# Patient Record
Sex: FEMALE | Race: BLACK OR AFRICAN AMERICAN | NOT HISPANIC OR LATINO | Employment: FULL TIME | URBAN - METROPOLITAN AREA
[De-identification: names, ages, dates, MRNs, and addresses within clinical notes are randomized per-mention and may not be internally consistent; named-entity substitution may affect disease eponyms.]

---

## 2017-02-16 ENCOUNTER — TELEPHONE (OUTPATIENT)
Dept: NEUROLOGY | Facility: CLINIC | Age: 22
End: 2017-02-16

## 2017-02-16 NOTE — TELEPHONE ENCOUNTER
Returned patient's call. Patient offered an appt with Dr Clark. Patient stated that she wants to know if Dr Clark takes her insurance. Patient asked that I find out and call her back tomorrow.

## 2017-02-16 NOTE — TELEPHONE ENCOUNTER
----- Message from Keke Hu sent at 2/16/2017  3:16 PM CST -----  Contact: Pt [phone]  States she is calling rg having medicaid ([facility]) and is wanting to be seen by . Is coming in for headaches and anxiety meds and can be reached at [phone]     There is an an appt avail on 03/23/17 in [place] that was avail

## 2017-02-24 ENCOUNTER — OFFICE VISIT (OUTPATIENT)
Dept: NEUROLOGY | Facility: CLINIC | Age: 22
End: 2017-02-24
Payer: MEDICAID

## 2017-02-24 VITALS
HEART RATE: 88 BPM | DIASTOLIC BLOOD PRESSURE: 94 MMHG | HEIGHT: 62 IN | WEIGHT: 125.25 LBS | BODY MASS INDEX: 23.05 KG/M2 | SYSTOLIC BLOOD PRESSURE: 146 MMHG

## 2017-02-24 DIAGNOSIS — G47.419 PRIMARY NARCOLEPSY WITHOUT CATAPLEXY: Primary | ICD-10-CM

## 2017-02-24 DIAGNOSIS — R29.2 HYPER-REFLEXIA: ICD-10-CM

## 2017-02-24 DIAGNOSIS — R53.82 CHRONIC FATIGUE: ICD-10-CM

## 2017-02-24 PROCEDURE — 99205 OFFICE O/P NEW HI 60 MIN: CPT | Mod: S$PBB,,, | Performed by: PSYCHIATRY & NEUROLOGY

## 2017-02-24 PROCEDURE — 99213 OFFICE O/P EST LOW 20 MIN: CPT | Mod: PBBFAC | Performed by: PSYCHIATRY & NEUROLOGY

## 2017-02-24 PROCEDURE — 99999 PR PBB SHADOW E&M-EST. PATIENT-LVL III: CPT | Mod: PBBFAC,,, | Performed by: PSYCHIATRY & NEUROLOGY

## 2017-02-24 NOTE — MR AVS SNAPSHOT
[facility]  [address] [facility] [address]  Phone: [phone]  Fax: [phone]                  Christopher Webber   2017 10:00 AM   Office Visit    Description:  Female : 1995   Provider:  Narda Red MD   Department:  [facility]           Reason for Visit     Headache           Diagnoses this Visit        Comments    Primary narcolepsy without cataplexy    -  Primary     Chronic fatigue         Hyper-reflexia                To Do List           Future Appointments        Provider Department Dept Phone    2017 11:30 AM LABORATORY, [address] [facility] [phone]    3/3/2017 9:00 AM [facility] MRI1 [facility] -  [phone]    3/6/2017 11:15 AM Brooke Amado CNM [facility] OB/ -[phone]    2017 9:40 AM Narda Red MD [facility] [phone]      Goals (5 Years of Data)     None      Follow-Up and Disposition     Return in about 1 month (around 3/24/2017).      [facility] -  Assistance  Registered nurses in the [facility] provide clinical advisement, health education, appointment booking, and other advisory services.  Call for this free service at [phone].             Medications           Message regarding Medications     Verify the changes and/or additions to your medication regime listed below are the same as discussed with your clinician today.  If any of these changes or additions are incorrect, please notify your healthcare provider.             Verify that the below list of medications is an accurate representation of the medications you are currently taking.  If none reported, the list may be blank. If incorrect, please contact your healthcare provider. Carry this list with you in case of emergency.           Current Medications     buPROPion (WELLBUTRIN) 100 MG tablet Take 100 mg by mouth.    escitalopram oxalate (LEXAPRO) 20 MG  tablet Take 20 mg by mouth.    pantoprazole (PROTONIX) 40 MG tablet TK 1 T PO QD           Clinical Reference Information           Your Vitals Were     BP                   146/94           Blood Pressure          Most Recent Value    BP  (!)  146/94      Allergies as of 2/24/2017     No Known Allergies      Immunizations Administered on Date of Encounter - 2/24/2017     None      Orders Placed During Today's Visit     Future Labs/Procedures Expected by Expires    CBC auto differential  2/24/2017 4/25/2018    Comprehensive metabolic panel  2/24/2017 4/25/2018    MRI Brain W WO Contrast  2/24/2017 2/24/2018    TSH  2/24/2017 4/25/2018    Polysomnography with MSLT  As directed 2/24/2018      Language Assistance Services     ATTENTION: Language assistance services are available, free of charge. Please call [phone].      ATENCIÓN: Si robbyla jenaelyubov, tiene a grayson disposición servicios gratuitos de asistencia lingüística. Llame al [phone].     CHÚ Ý: N?u b?n nói Ti?ng Vi?t, có các d?ch v? h? tr? ngôn ng? mi?n phí dành cho b?n. G?i s? [phone].         [facility] complies with applicable Federal civil rights laws and does not discriminate on the basis of race, color, national origin, age, disability, or sex.

## 2017-02-24 NOTE — PROGRESS NOTES
Consult Requested By: No att. providers found  Reason for Consult:   1. Narcollepsy  2. Sleepiness   3. Tired and fatigued.    SUBJECTIVE:       HPI:   This is a 21-year-old right-handed pleasant lady, presented today in the clinic   with the complaint of tiredness, fatigue for three to four years.  She said that   she was diagnosed with narcolepsy at age 17 and she was given Ritalin and Provigil  in   the past, but because of the side effects she did not take it. However, now she   is feeling sleepiness all the time, plus she is drowsy, fatigued and tired all   day.  It is affecting her work.  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  She also states that she has problem with anxiety and for that she   take Lexapro and Wellbutrin.  She is not sleeping well, feels tired and sometime   at night she has some jerking in sleep.  She said that she had sleep paralysis   when she was diagnosed with narcolepsy, but that has gotten better.  She does   not take any medication for narcolepsy now.      Past Medical History:   Diagnosis Date    Narcolepsy      Past Surgical History:   Procedure Laterality Date    tonsilectomy       Family History   Problem Relation Age of Onset    Breast cancer Neg Hx     Cancer Neg Hx     Colon cancer Neg Hx     Diabetes Neg Hx     Eclampsia Neg Hx     Hypertension Neg Hx     Miscarriages / Stillbirths Neg Hx      labor Neg Hx     Ovarian cancer Neg Hx     Stroke Neg Hx      Social History   Substance Use Topics    Smoking status: Never Smoker    Smokeless tobacco: Never Used    Alcohol use No      Comment: social     Review of patient's allergies indicates:  No Known Allergies    Review of Systems   Constitutional: Negative for fever and weight loss.   HENT: Negative for hearing loss.    Eyes: Negative for blurred vision, double vision, photophobia and pain.   Respiratory: Negative for cough.    Cardiovascular: Negative for chest pain.   Gastrointestinal:  Negative for abdominal pain, nausea and vomiting.   Genitourinary: Negative for dysuria, frequency and urgency.   Musculoskeletal: Negative.  Negative for back pain, falls, joint pain, myalgias and neck pain.   Skin: Negative for itching and rash.   Neurological: Negative for tingling and headaches.        Hypersomnia   Tired and fatigued    Psychiatric/Behavioral: Negative for depression and memory loss. The patient is nervous/anxious.          OBJECTIVE:     Vital Signs (Most Recent)  Pulse: 88 (02/24/17 1008)  BP: (!) 146/94 (02/24/17 1008)    Physical Exam   Constitutional: She is oriented to person, place, and time. She appears well-developed and well-nourished.   HENT:   Head: Normocephalic and atraumatic.   Eyes: Conjunctivae and EOM are normal. Pupils are equal, round, and reactive to light.   Neck: Normal range of motion. Neck supple. No JVD present. No tracheal deviation present. No thyromegaly present.   Cardiovascular: Normal rate, regular rhythm and normal heart sounds.    Pulmonary/Chest: Effort normal and breath sounds normal.   Abdominal: She exhibits no distension. There is no tenderness.   Musculoskeletal: Normal range of motion. She exhibits no edema or tenderness.   Neurological: She is alert and oriented to person, place, and time. She has normal strength. She displays normal reflexes. No cranial nerve deficit or sensory deficit. She exhibits normal muscle tone. She displays a negative Romberg sign. Coordination and gait normal.   Reflex Scores:       Tricep reflexes are 3+ on the right side and 3+ on the left side.       Bicep reflexes are 3+ on the right side and 3+ on the left side.       Brachioradialis reflexes are 3+ on the right side and 3+ on the left side.       Patellar reflexes are 3+ on the right side and 3+ on the left side.       Achilles reflexes are 4+ on the right side and 4+ on the left side.  She has nonsustained clonus in ankles .   Skin: Skin is warm and dry. No rash noted.    Psychiatric: She has a normal mood and affect. Her behavior is normal. Judgment and thought content normal.         Strength  Deltoids Triceps Biceps Wrist Extension Wrist Flexion Hand    Upper: R 5/5 5/5 5/5 5/5 5/5 5/5    L 5/5 5/5 5/5 5/5 5/5 5/5     Iliopsoas Quadriceps Knee  Flexion Tibialis  anterior Gastro- cnemius EHL   Lower: R 5/5 5/5 5/5 5/5 5/5 5/5    L 5/5 5/5 5/5 5/5 5/5 5/5         ASSESSMENT/PLAN:     Primary Diagnoses:  1.  History of Narcolepsy  2.  Chronic fatigue and tiredness   3. Rule out MS      Plan:  1. MRI of the brain   2. Sleep study.  3. Blood works.  Time spent: 60 minutes in face to face discussion concerning diagnosis, prognosis, review of lab and test results, benefits of treatment as well as management of disease, counseling of patient and coordination of care between various health care providers . Greater than half the time spent was used for coordination of care and counseling of patient.

## 2017-02-27 ENCOUNTER — TELEPHONE (OUTPATIENT)
Dept: PULMONOLOGY | Facility: HOSPITAL | Age: 22
End: 2017-02-27

## 2017-03-02 DIAGNOSIS — R29.2 HYPERREFLEXIA: ICD-10-CM

## 2017-03-02 DIAGNOSIS — R53.82 CHRONIC FATIGUE: Primary | ICD-10-CM

## 2017-03-06 ENCOUNTER — OFFICE VISIT (OUTPATIENT)
Dept: OBSTETRICS AND GYNECOLOGY | Facility: CLINIC | Age: 22
End: 2017-03-06
Payer: MEDICAID

## 2017-03-06 ENCOUNTER — LAB VISIT (OUTPATIENT)
Dept: LAB | Facility: HOSPITAL | Age: 22
End: 2017-03-06
Attending: PSYCHIATRY & NEUROLOGY
Payer: MEDICAID

## 2017-03-06 VITALS
DIASTOLIC BLOOD PRESSURE: 78 MMHG | SYSTOLIC BLOOD PRESSURE: 122 MMHG | HEIGHT: 62 IN | WEIGHT: 127 LBS | BODY MASS INDEX: 23.37 KG/M2

## 2017-03-06 DIAGNOSIS — G47.419 PRIMARY NARCOLEPSY WITHOUT CATAPLEXY: ICD-10-CM

## 2017-03-06 DIAGNOSIS — R29.2 HYPERREFLEXIA: ICD-10-CM

## 2017-03-06 DIAGNOSIS — R29.2 HYPER-REFLEXIA: ICD-10-CM

## 2017-03-06 DIAGNOSIS — Z12.4 ENCOUNTER FOR PAPANICOLAOU SMEAR FOR CERVICAL CANCER SCREENING: ICD-10-CM

## 2017-03-06 DIAGNOSIS — Z01.419 VISIT FOR GYNECOLOGIC EXAMINATION: Primary | ICD-10-CM

## 2017-03-06 DIAGNOSIS — R53.82 CHRONIC FATIGUE: ICD-10-CM

## 2017-03-06 DIAGNOSIS — Z01.419 VISIT FOR GYNECOLOGIC EXAMINATION: ICD-10-CM

## 2017-03-06 LAB
ALBUMIN SERPL BCP-MCNC: 3.9 G/DL
ALP SERPL-CCNC: 88 U/L
ALT SERPL W/O P-5'-P-CCNC: 14 U/L
ANION GAP SERPL CALC-SCNC: 9 MMOL/L
AST SERPL-CCNC: 20 U/L
BASOPHILS # BLD AUTO: 0.02 K/UL
BASOPHILS NFR BLD: 0.3 %
BILIRUB SERPL-MCNC: 0.4 MG/DL
BUN SERPL-MCNC: 6 MG/DL
BUN SERPL-MCNC: 6 MG/DL
CALCIUM SERPL-MCNC: 9.6 MG/DL
CHLORIDE SERPL-SCNC: 105 MMOL/L
CO2 SERPL-SCNC: 25 MMOL/L
CREAT SERPL-MCNC: 0.9 MG/DL
CREAT SERPL-MCNC: 0.9 MG/DL
DIFFERENTIAL METHOD: ABNORMAL
EOSINOPHIL # BLD AUTO: 0.1 K/UL
EOSINOPHIL NFR BLD: 0.9 %
ERYTHROCYTE [DISTWIDTH] IN BLOOD BY AUTOMATED COUNT: 14.8 %
EST. GFR  (AFRICAN AMERICAN): >60 ML/MIN/1.73 M^2
EST. GFR  (AFRICAN AMERICAN): >60 ML/MIN/1.73 M^2
EST. GFR  (NON AFRICAN AMERICAN): >60 ML/MIN/1.73 M^2
EST. GFR  (NON AFRICAN AMERICAN): >60 ML/MIN/1.73 M^2
GLUCOSE SERPL-MCNC: 66 MG/DL
HCT VFR BLD AUTO: 42.3 %
HGB BLD-MCNC: 14.6 G/DL
LYMPHOCYTES # BLD AUTO: 2.2 K/UL
LYMPHOCYTES NFR BLD: 34.3 %
MCH RBC QN AUTO: 28 PG
MCHC RBC AUTO-ENTMCNC: 34.5 %
MCV RBC AUTO: 81 FL
MONOCYTES # BLD AUTO: 0.4 K/UL
MONOCYTES NFR BLD: 6.4 %
NEUTROPHILS # BLD AUTO: 3.8 K/UL
NEUTROPHILS NFR BLD: 57.9 %
PLATELET # BLD AUTO: 389 K/UL
PMV BLD AUTO: 9.5 FL
POTASSIUM SERPL-SCNC: 3.4 MMOL/L
PROT SERPL-MCNC: 7.8 G/DL
RBC # BLD AUTO: 5.22 M/UL
SODIUM SERPL-SCNC: 139 MMOL/L
TSH SERPL DL<=0.005 MIU/L-ACNC: 0.48 UIU/ML
WBC # BLD AUTO: 6.54 K/UL

## 2017-03-06 PROCEDURE — 86592 SYPHILIS TEST NON-TREP QUAL: CPT

## 2017-03-06 PROCEDURE — 85025 COMPLETE CBC W/AUTO DIFF WBC: CPT

## 2017-03-06 PROCEDURE — 99395 PREV VISIT EST AGE 18-39: CPT | Mod: S$PBB,,, | Performed by: ADVANCED PRACTICE MIDWIFE

## 2017-03-06 PROCEDURE — 36415 COLL VENOUS BLD VENIPUNCTURE: CPT

## 2017-03-06 PROCEDURE — 80053 COMPREHEN METABOLIC PANEL: CPT

## 2017-03-06 PROCEDURE — 84443 ASSAY THYROID STIM HORMONE: CPT

## 2017-03-06 PROCEDURE — 99999 PR PBB SHADOW E&M-EST. PATIENT-LVL II: CPT | Mod: PBBFAC,,, | Performed by: ADVANCED PRACTICE MIDWIFE

## 2017-03-06 PROCEDURE — 86703 HIV-1/HIV-2 1 RESULT ANTBDY: CPT

## 2017-03-06 PROCEDURE — 80074 ACUTE HEPATITIS PANEL: CPT

## 2017-03-06 NOTE — PROGRESS NOTES
Subjective:       Christopher Webber is a 21 y.o. female here for a routine exam.  Current complaints:   Chief Complaint   Patient presents with    Annual Exam    Gynecologic Exam   .      Gynecologic History  Patient's last menstrual period was 2017 (approximate).  Contraception: none declines need  Last Pap: NA. Results were: NA    Obstetric History  OB History    Para Term  AB SAB TAB Ectopic Multiple Living   0 0 0 0 0 0 0 0 0 0               The following portions of the patient's history were reviewed and updated as appropriate: She  has a past medical history of Narcolepsy.  She  has a past surgical history that includes tonsilectomy.  Her family history is negative for Breast cancer, Cancer, Colon cancer, Diabetes, Eclampsia, Hypertension, Miscarriages / Stillbirths,  labor, Ovarian cancer, and Stroke.  She  reports that she has never smoked. She has never used smokeless tobacco. She reports that she does not drink alcohol or use illicit drugs..    Review of Systems  A comprehensive review of systems was negative.      Objective:       APPEARANCE: Well nourished, well developed, in no acute distress.  AFFECT: WNL, alert and oriented x 3  SKIN: No acne or hirsutism  NECK: Neck symmetric without masses or thyromegaly  NODES: No inguinal, cervical, axillary, or femoral lymph node enlargement  ABDOMEN: Soft.  No tenderness or masses.  No hepatosplenomegaly.  No hernias.  BREASTS: Symmetrical, no skin changes or visible lesions.  No palpable masses, nipple discharge bilaterally.  PELVIC: Normal external genitalia without lesions.  Normal hair distribution.  Adequate perineal body, normal urethral meatus.  Vagina moist and well rugated without lesions or discharge.  Cervix pink, without lesions, discharge or tenderness.  No significant cystocele or rectocele.  Bimanual exam shows uterus to be normal size, regular, mobile and nontender.  Adnexa without masses or tenderness.    EXTREMITIES:  No edema.    Assessment:      Healthy female exam.   Plan:   Contraception: declines need for any today.  Follow up in: 1 year.   Visit for gynecologic examination  -     HIV-1 and HIV-2 antibodies; Future; Expected date: 3/6/17  -     RPR; Future; Expected date: 3/6/17  -     C. trachomatis/N. gonorrhoeae by AMP DNA Vagina  -     Hepatitis panel, acute; Future; Expected date: 3/6/17    Encounter for Papanicolaou smear for cervical cancer screening  -     Liquid-based pap smear, screening     discussed pap frequency, labs today. al

## 2017-03-07 ENCOUNTER — PATIENT MESSAGE (OUTPATIENT)
Dept: NEUROLOGY | Facility: CLINIC | Age: 22
End: 2017-03-07

## 2017-03-07 LAB
HAV IGM SERPL QL IA: NEGATIVE
HBV CORE IGM SERPL QL IA: NEGATIVE
HBV SURFACE AG SERPL QL IA: NEGATIVE
HCV AB SERPL QL IA: NEGATIVE
HIV 1+2 AB+HIV1 P24 AG SERPL QL IA: NEGATIVE
RPR SER QL: NORMAL

## 2017-03-08 LAB
C TRACH DNA SPEC QL NAA+PROBE: NEGATIVE
N GONORRHOEA DNA SPEC QL NAA+PROBE: NEGATIVE

## 2017-03-15 ENCOUNTER — PATIENT MESSAGE (OUTPATIENT)
Dept: NEUROLOGY | Facility: CLINIC | Age: 22
End: 2017-03-15

## 2017-03-17 ENCOUNTER — PATIENT MESSAGE (OUTPATIENT)
Dept: NEUROLOGY | Facility: CLINIC | Age: 22
End: 2017-03-17

## 2017-03-18 ENCOUNTER — HOSPITAL ENCOUNTER (OUTPATIENT)
Dept: SLEEP MEDICINE | Facility: HOSPITAL | Age: 22
Discharge: HOME | End: 2017-03-18
Attending: PSYCHIATRY & NEUROLOGY
Payer: MEDICAID

## 2017-03-18 DIAGNOSIS — G47.419 PRIMARY NARCOLEPSY WITHOUT CATAPLEXY: ICD-10-CM

## 2017-03-18 DIAGNOSIS — R53.82 CHRONIC FATIGUE: ICD-10-CM

## 2017-03-18 DIAGNOSIS — R29.2 HYPER-REFLEXIA: ICD-10-CM

## 2017-03-18 DIAGNOSIS — G47.12 IDIOPATHIC HYPERSOMNIA WITHOUT LONG SLEEP TIME: ICD-10-CM

## 2017-03-18 PROCEDURE — 95810 POLYSOM 6/> YRS 4/> PARAM: CPT | Mod: 26,,, | Performed by: INTERNAL MEDICINE

## 2017-03-18 PROCEDURE — 95805 MULTIPLE SLEEP LATENCY TEST: CPT | Mod: ,,, | Performed by: INTERNAL MEDICINE

## 2017-03-18 PROCEDURE — 95805 MULTIPLE SLEEP LATENCY TEST: CPT

## 2017-03-18 PROCEDURE — 95810 POLYSOM 6/> YRS 4/> PARAM: CPT

## 2017-03-21 NOTE — PROCEDURES
SUMMARY STATEMENTS:  1. Findings related to sleep diagnoses:   Normal sleep architecture.  Sleep efficiency was high.  Sleep latency was delayed.  Total sleep time was 6.7 hours.   No periodic leg movements.  Sleep arousal index was 15.5 events per hour.   Normal oxygen saturation.  Normal AHI.  2. EEG abnormalities:   REM latency was normal 101 minutes.  Wake after sleep onset was low.  Sleep continuity was high.  3. ECG abnormalities:   Normal EKG.  Average heart rate 90 bpm with a maximal heart rate 1 29 bpm    INTERPRETATION:     1. Normal apnea hypopnea index.  Normal sleep architecture.  Normal sleep efficiency.  2. Sleep latency was delayed.    3. Total sleep time was 6.7 hours.  4. Normal apnea Hypopnea  index.  No periodic limb movements.    SDI questionnaire was reviewed.    Study was ordered based on restless sleep, waking up groggy for 5 years.    Bedtime is 10 PM wakeup time is 9 AM.  Sleep latency delayed. Trouble falling asleep, prefers to go to bed late and wake up late. Legs feel odd and restless and must move them to feel better.  Denied cataplexy, visual hallucination, or sleep paralysis. Worry too much, hard to fall asleep, running thoughts.  Patient often moves violently during sleep.  Reported alcohol use within 2 hours of bedtime 5 nights a month.  Had been using alcohol to help fall asleep.  This is for 10 days in the month.  Take frequent naps.      Conclusions:    1. 4 nap protocols was utilized.    2. Patient had a normal diagnostic polysomnography.  Normal AHI.  3. REM sleep was recorded on the first and second nap.  Sleep latency on the first and second nap was less than 2 minutes.  4. Overall sleep late tendency was 10.26 minutes.  This is within the normal range.  However the presence of REM onset sleep is suggestive/supportive of narcolepsy; however Mean sleep latency needs to be < 8.0 minutes.  5. Other entities like chronic sleep deprivation also need to be  "considered    RECOMMENDATIONS:     1. Study doesnt fulfil all criteria for Narcolepsy type 1 or type 2 diagnosis.  2. Study demonstrates normal sleep latency however pathological sleepiness in first 2 naps : this can be seen in underlying mental illness or chronic sleep deprivation   3. Clinical correlation for circadian rhythm disorder (delayed sleep phase) or restless legs syndrome.  4. Consider  HLA typing for  DQB1*0602 or DR2 positivity  5. Improve sleep hygiene and alcohol abstinence.  6. Sleep diary  7. If clinical suspicion for Narcolepsy is still high test may be repeated. Patient would have to be tapered off LEXAPRO ( suppresses REM)  8. Follow up in sleep clinic with Sleep physician      See imported Sleep Study result in "Chart Review" under the   "Media tab".      (This Sleep Study was interpreted by a Board Certified Sleep   Specialist who conducted an epoch-by-epoch review of the entire   raw data recording.)     (The indication for this sleep study was reviewed and deemed   appropriate by [employer] Practice Parameters or other reasons by a   Board Certified Sleep Specialist.)    Kenny Lopez MD      "

## 2017-03-23 ENCOUNTER — TELEPHONE (OUTPATIENT)
Dept: RADIOLOGY | Facility: HOSPITAL | Age: 22
End: 2017-03-23

## 2017-03-24 ENCOUNTER — HOSPITAL ENCOUNTER (OUTPATIENT)
Dept: RADIOLOGY | Facility: HOSPITAL | Age: 22
Discharge: HOME | End: 2017-03-24
Attending: PSYCHIATRY & NEUROLOGY
Payer: MEDICAID

## 2017-03-24 DIAGNOSIS — G47.419 PRIMARY NARCOLEPSY WITHOUT CATAPLEXY: ICD-10-CM

## 2017-03-24 DIAGNOSIS — R53.82 CHRONIC FATIGUE: ICD-10-CM

## 2017-03-24 DIAGNOSIS — R29.2 HYPER-REFLEXIA: ICD-10-CM

## 2017-03-24 PROCEDURE — A9585 GADOBUTROL INJECTION: HCPCS | Mod: PO | Performed by: PSYCHIATRY & NEUROLOGY

## 2017-03-24 PROCEDURE — 70553 MRI BRAIN STEM W/O & W/DYE: CPT | Mod: TC,PO

## 2017-03-24 PROCEDURE — 25500020 PHARM REV CODE 255: Mod: PO | Performed by: PSYCHIATRY & NEUROLOGY

## 2017-03-24 PROCEDURE — 70553 MRI BRAIN STEM W/O & W/DYE: CPT | Mod: 26,,, | Performed by: RADIOLOGY

## 2017-03-24 RX ORDER — GADOBUTROL 604.72 MG/ML
5.5 INJECTION INTRAVENOUS
Status: COMPLETED | OUTPATIENT
Start: 2017-03-24 | End: 2017-03-24

## 2017-03-24 RX ADMIN — GADOBUTROL 5.5 ML: 604.72 INJECTION INTRAVENOUS at 03:03

## 2017-03-25 ENCOUNTER — PATIENT MESSAGE (OUTPATIENT)
Dept: OBSTETRICS AND GYNECOLOGY | Facility: CLINIC | Age: 22
End: 2017-03-25

## 2017-03-30 ENCOUNTER — TELEPHONE (OUTPATIENT)
Dept: NEUROLOGY | Facility: CLINIC | Age: 22
End: 2017-03-30

## 2017-03-30 NOTE — TELEPHONE ENCOUNTER
----- Message from Ingrid Fajardo sent at 3/30/2017  9:27 AM CDT -----  Contact: pt  Pt returning nurse call, please call pt @ [phone].

## 2017-03-31 ENCOUNTER — TELEPHONE (OUTPATIENT)
Dept: PULMONOLOGY | Facility: CLINIC | Age: 22
End: 2017-03-31

## 2017-03-31 DIAGNOSIS — G47.30 SLEEP DISORDER BREATHING: Primary | ICD-10-CM

## 2017-07-03 ENCOUNTER — OFFICE VISIT (OUTPATIENT)
Dept: INTERNAL MEDICINE | Facility: CLINIC | Age: 22
End: 2017-07-03
Payer: COMMERCIAL

## 2017-07-03 ENCOUNTER — PATIENT MESSAGE (OUTPATIENT)
Dept: INTERNAL MEDICINE | Facility: CLINIC | Age: 22
End: 2017-07-03

## 2017-07-03 VITALS
DIASTOLIC BLOOD PRESSURE: 90 MMHG | TEMPERATURE: 97 F | OXYGEN SATURATION: 99 % | HEIGHT: 62 IN | HEART RATE: 88 BPM | WEIGHT: 121.25 LBS | SYSTOLIC BLOOD PRESSURE: 122 MMHG | BODY MASS INDEX: 22.31 KG/M2

## 2017-07-03 DIAGNOSIS — B37.9 YEAST INFECTION: ICD-10-CM

## 2017-07-03 DIAGNOSIS — R30.0 DYSURIA: Primary | ICD-10-CM

## 2017-07-03 DIAGNOSIS — N76.0 BACTERIAL VAGINOSIS: ICD-10-CM

## 2017-07-03 DIAGNOSIS — N30.00 ACUTE CYSTITIS WITHOUT HEMATURIA: ICD-10-CM

## 2017-07-03 DIAGNOSIS — B96.89 BACTERIAL VAGINOSIS: ICD-10-CM

## 2017-07-03 LAB
BACTERIA #/AREA URNS HPF: ABNORMAL /HPF
BILIRUB UR QL STRIP: NEGATIVE
CLARITY UR: ABNORMAL
COLOR UR: YELLOW
GLUCOSE UR QL STRIP: NEGATIVE
HGB UR QL STRIP: ABNORMAL
KETONES UR QL STRIP: NEGATIVE
LEUKOCYTE ESTERASE UR QL STRIP: ABNORMAL
MICROSCOPIC COMMENT: ABNORMAL
NITRITE UR QL STRIP: NEGATIVE
PH UR STRIP: 6 [PH] (ref 5–8)
PROT UR QL STRIP: NEGATIVE
RBC #/AREA URNS HPF: 10 /HPF (ref 0–4)
SP GR UR STRIP: 1.01 (ref 1–1.03)
SQUAMOUS #/AREA URNS HPF: 4 /HPF
URN SPEC COLLECT METH UR: ABNORMAL
WBC #/AREA URNS HPF: 80 /HPF (ref 0–5)

## 2017-07-03 PROCEDURE — 87088 URINE BACTERIA CULTURE: CPT

## 2017-07-03 PROCEDURE — 99999 PR PBB SHADOW E&M-EST. PATIENT-LVL III: CPT | Mod: PBBFAC,,, | Performed by: FAMILY MEDICINE

## 2017-07-03 PROCEDURE — 87077 CULTURE AEROBIC IDENTIFY: CPT

## 2017-07-03 PROCEDURE — 87086 URINE CULTURE/COLONY COUNT: CPT

## 2017-07-03 PROCEDURE — 87186 SC STD MICRODIL/AGAR DIL: CPT

## 2017-07-03 PROCEDURE — 81000 URINALYSIS NONAUTO W/SCOPE: CPT

## 2017-07-03 PROCEDURE — 99214 OFFICE O/P EST MOD 30 MIN: CPT | Mod: S$GLB,,, | Performed by: FAMILY MEDICINE

## 2017-07-03 RX ORDER — VENLAFAXINE HYDROCHLORIDE 75 MG/1
75 CAPSULE, EXTENDED RELEASE ORAL
COMMUNITY
Start: 2017-06-30 | End: 2017-08-16

## 2017-07-03 RX ORDER — FLUCONAZOLE 150 MG/1
150 TABLET ORAL DAILY
Qty: 2 TABLET | Refills: 0 | Status: SHIPPED | OUTPATIENT
Start: 2017-07-03 | End: 2017-07-05

## 2017-07-03 RX ORDER — SULFAMETHOXAZOLE AND TRIMETHOPRIM 800; 160 MG/1; MG/1
1 TABLET ORAL 2 TIMES DAILY
Qty: 14 TABLET | Refills: 0 | Status: SHIPPED | OUTPATIENT
Start: 2017-07-03 | End: 2017-07-10

## 2017-07-03 RX ORDER — TRAMADOL HYDROCHLORIDE AND ACETAMINOPHEN 37.5; 325 MG/1; MG/1
2 TABLET, FILM COATED ORAL
COMMUNITY
Start: 2017-06-30 | End: 2017-07-30

## 2017-07-03 RX ORDER — GINSENG 100 MG
1 CAPSULE ORAL DAILY
Qty: 90 EACH | Refills: 1 | Status: SHIPPED | OUTPATIENT
Start: 2017-07-03 | End: 2017-10-17

## 2017-07-03 NOTE — PROGRESS NOTES
Subjective:       Patient ID: Christopher Webber is a 21 y.o. female.    Chief Complaint: Dysuria    HPI  Ms. Webber presents today for possible UTI and concerned about hormones. Symptoms of UTI started yesterday where she felt she still had to void after using the restroom. No increased frequency. She has had same symptoms with UTI before.   She also complains of recurrent BV infections where she has done multiple rounds of oral Flagyl every couple months for a while she has used boric acid as well. She started reading and tried probiotics from [employer] and felt her symptoms were made worse. She would like to try a different one if possible. She states she has an odor that she notices because she is very in tune with her body.     She states that her last two cycles have been longer and heavier than normal. This is why she questions if the BV is hormonal. Currently she does not have vaginal itching she states she only has an odor that she notices.     Review of Systems   Constitutional: Negative for chills and fever.   Genitourinary: Positive for difficulty urinating and dysuria. Negative for decreased urine volume, enuresis, flank pain, frequency, hematuria, pelvic pain, vaginal bleeding, vaginal discharge and vaginal pain.   Neurological: Negative for dizziness.   Psychiatric/Behavioral: Negative for agitation and behavioral problems.        Objective:        Physical Exam   Constitutional: She is oriented to person, place, and time. She appears well-developed and well-nourished.   HENT:   Head: Normocephalic and atraumatic.   Cardiovascular: Normal heart sounds.    Pulmonary/Chest: Breath sounds normal.   Abdominal: Soft. She exhibits no distension. There is no tenderness.   Neurological: She is alert and oriented to person, place, and time.   Vitals reviewed.        Assessment/Plan:   Dysuria  -     Urinalysis- hazy, Trace blood, LE +2  -     Urine culture- pending        -     Urinalysis Microscopic- Moderate  bacteria, 80 WBC 10 RBC     Bacterial vaginosis  -     L.acidoph,saliva-B.bif-S.therm (ACIDOPHILUS PROBIOTIC BLEND) 175 mg Cap; Take 1 capsule by mouth once daily at 6am.  Dispense: 90 each; Refill: 1    Acute cystitis without hematuria  -     sulfamethoxazole-trimethoprim 800-160mg (BACTRIM DS) 800-160 mg Tab; Take 1 tablet by mouth 2 (two) times daily.  Dispense: 14 tablet; Refill: 0    Yeast infection  -     fluconazole (DIFLUCAN) 150 MG Tab; Take 1 tablet (150 mg total) by mouth once daily. Repeat after antibiotic course on day 7  Dispense: 2 tablet; Refill: 0  Pt reports yeast infections with antibiotic use.           Return if symptoms worsen or fail to improve.    Chantel Bowen MD  [facility]   Family Medicine

## 2017-07-05 ENCOUNTER — OFFICE VISIT (OUTPATIENT)
Dept: INTERNAL MEDICINE | Facility: CLINIC | Age: 22
End: 2017-07-05
Payer: COMMERCIAL

## 2017-07-05 ENCOUNTER — LAB VISIT (OUTPATIENT)
Dept: LAB | Facility: HOSPITAL | Age: 22
End: 2017-07-05
Attending: FAMILY MEDICINE
Payer: COMMERCIAL

## 2017-07-05 VITALS
HEART RATE: 80 BPM | TEMPERATURE: 97 F | BODY MASS INDEX: 22.55 KG/M2 | SYSTOLIC BLOOD PRESSURE: 110 MMHG | HEIGHT: 62 IN | DIASTOLIC BLOOD PRESSURE: 80 MMHG | WEIGHT: 122.56 LBS

## 2017-07-05 DIAGNOSIS — Z02.0 SCHOOL PHYSICAL EXAM: ICD-10-CM

## 2017-07-05 DIAGNOSIS — Z23 NEED FOR TDAP VACCINATION: ICD-10-CM

## 2017-07-05 DIAGNOSIS — Z76.89 ESTABLISHING CARE WITH NEW DOCTOR, ENCOUNTER FOR: ICD-10-CM

## 2017-07-05 DIAGNOSIS — Z02.0 SCHOOL PHYSICAL EXAM: Primary | ICD-10-CM

## 2017-07-05 PROCEDURE — 90715 TDAP VACCINE 7 YRS/> IM: CPT | Mod: S$GLB,,, | Performed by: FAMILY MEDICINE

## 2017-07-05 PROCEDURE — 99999 PR PBB SHADOW E&M-EST. PATIENT-LVL III: CPT | Mod: PBBFAC,,, | Performed by: FAMILY MEDICINE

## 2017-07-05 PROCEDURE — 86706 HEP B SURFACE ANTIBODY: CPT

## 2017-07-05 PROCEDURE — 86787 VARICELLA-ZOSTER ANTIBODY: CPT

## 2017-07-05 PROCEDURE — 86762 RUBELLA ANTIBODY: CPT

## 2017-07-05 PROCEDURE — 86735 MUMPS ANTIBODY: CPT

## 2017-07-05 PROCEDURE — 86765 RUBEOLA ANTIBODY: CPT

## 2017-07-05 PROCEDURE — 99395 PREV VISIT EST AGE 18-39: CPT | Mod: 25,S$GLB,, | Performed by: FAMILY MEDICINE

## 2017-07-05 PROCEDURE — 86580 TB INTRADERMAL TEST: CPT | Mod: S$GLB,,, | Performed by: FAMILY MEDICINE

## 2017-07-05 PROCEDURE — 90471 IMMUNIZATION ADMIN: CPT | Mod: S$GLB,,, | Performed by: FAMILY MEDICINE

## 2017-07-05 PROCEDURE — 36415 COLL VENOUS BLD VENIPUNCTURE: CPT

## 2017-07-05 RX ORDER — MUPIROCIN 20 MG/G
OINTMENT TOPICAL
Refills: 0 | COMMUNITY
Start: 2017-07-02 | End: 2017-10-03

## 2017-07-05 NOTE — PROGRESS NOTES
Subjective:       Patient ID: hCristopher Webber is a 21 y.o. female.    Chief Complaint: Annual Exam (school)    HPI Ms. Webber presents to [facility] care and have school physical.   She has no complaint today.     Review of Systems   Constitutional: Negative for activity change and unexpected weight change.   HENT: Negative for hearing loss, rhinorrhea and trouble swallowing.    Eyes: Negative for discharge and visual disturbance.   Respiratory: Negative for chest tightness and wheezing.    Cardiovascular: Negative for chest pain and palpitations.   Gastrointestinal: Negative for blood in stool, constipation, diarrhea and vomiting.   Endocrine: Negative for polydipsia and polyuria.   Genitourinary: Negative for difficulty urinating, dysuria, hematuria and menstrual problem.   Musculoskeletal: Negative for arthralgias, joint swelling and neck pain.   Neurological: Negative for weakness and headaches.   Psychiatric/Behavioral: Negative for confusion and dysphoric mood.           Past Medical History:   Diagnosis Date    Narcolepsy      Past Surgical History:   Procedure Laterality Date    tonsilectomy       Family History   Problem Relation Age of Onset    Breast cancer Neg Hx     Cancer Neg Hx     Colon cancer Neg Hx     Diabetes Neg Hx     Eclampsia Neg Hx     Hypertension Neg Hx     Miscarriages / Stillbirths Neg Hx      labor Neg Hx     Ovarian cancer Neg Hx     Stroke Neg Hx      Social History     Social History    Marital status: Single     Spouse name: N/A    Number of children: N/A    Years of education: N/A     Social History Main Topics    Smoking status: Never Smoker    Smokeless tobacco: Never Used    Alcohol use No      Comment: social    Drug use: No    Sexual activity: Yes     Partners: Male     Birth control/ protection: None, Condom     Other Topics Concern    None     Social History Narrative    None       Objective:        Physical Exam   Constitutional: She is oriented to  person, place, and time. She appears well-nourished. No distress.   HENT:   Head: Normocephalic and atraumatic.   Right Ear: External ear normal.   Left Ear: External ear normal.   Nose: Nose normal.   Mouth/Throat: Oropharynx is clear and moist.   Eyes: EOM are normal. Pupils are equal, round, and reactive to light. Right eye exhibits no discharge. Left eye exhibits no discharge.   Neck: Normal range of motion. Neck supple. No thyromegaly present.   Cardiovascular: Normal rate, regular rhythm and normal heart sounds.    No murmur heard.  Pulmonary/Chest: Effort normal and breath sounds normal. No respiratory distress. She has no wheezes.   Abdominal: Soft. Bowel sounds are normal. She exhibits no distension. There is no tenderness.   Musculoskeletal: Normal range of motion. She exhibits no edema.   Neurological: She is alert and oriented to person, place, and time. Coordination normal.   Skin: Skin is warm and dry. No rash noted.   Psychiatric: She has a normal mood and affect. Her behavior is normal.   Nursing note and vitals reviewed.        Assessment/Plan:   School physical exam  -     Rubella antibody, IgG; Future; Expected date: 07/05/2017  -     Rubeola antibody IgG; Future; Expected date: 07/05/2017  -     Mumps, IgG Screen; Future; Expected date: 07/05/2017  -     Hepatitis B Surface Antibody, Qual/Quant; Future; Expected date: 07/05/2017  -     Varicella zoster antibody, IgG; Future; Expected date: 07/05/2017  -     (In Office Administered) Tdap Vaccine  -     POCT TB Skin Test Read    Need for Tdap vaccination  -     (In Office Administered) Tdap Vaccine    Establishing care with new doctor, encounter for  Discussed medical, social, surgical and family history. Reviewed health maintenance. Pt not fasting to have lipid panel today.     Return if symptoms worsen or fail to improve.    Chantel Bowen MD  [facility]   Family Medicine

## 2017-07-06 LAB
BACTERIA UR CULT: NORMAL
MUMPS IGG INTERPRETATION: POSITIVE
MUMPS IGG SCREEN: 2.72 ISR
RUBEOLA IGG ANTIBODY: 1.71 ISR
RUBEOLA INTERPRETATION: POSITIVE
VARICELLA INTERPRETATION: POSITIVE
VARICELLA ZOSTER IGG: 2.47 ISR

## 2017-07-07 ENCOUNTER — CLINICAL SUPPORT (OUTPATIENT)
Dept: INTERNAL MEDICINE | Facility: CLINIC | Age: 22
End: 2017-07-07
Payer: COMMERCIAL

## 2017-07-07 LAB
RUBV IGG SER-ACNC: 47.3 IU/ML
RUBV IGG SER-IMP: REACTIVE
TB INDURATION - 48 HR READ: NORMAL MM
TB INDURATION - 72 HR READ: NORMAL MM
TB SKIN TEST - 48 HR READ: NORMAL
TB SKIN TEST - 72 HR READ: NORMAL

## 2017-07-14 LAB
HEP. B SURF AB, QUAL: POSITIVE
HEP. B SURF AB, QUANT.: 14 MIU/ML

## 2017-08-01 ENCOUNTER — PATIENT MESSAGE (OUTPATIENT)
Dept: INTERNAL MEDICINE | Facility: CLINIC | Age: 22
End: 2017-08-01

## 2017-08-15 ENCOUNTER — PATIENT MESSAGE (OUTPATIENT)
Dept: INTERNAL MEDICINE | Facility: CLINIC | Age: 22
End: 2017-08-15

## 2017-08-16 ENCOUNTER — OFFICE VISIT (OUTPATIENT)
Dept: INTERNAL MEDICINE | Facility: CLINIC | Age: 22
End: 2017-08-16
Payer: COMMERCIAL

## 2017-08-16 ENCOUNTER — LAB VISIT (OUTPATIENT)
Dept: LAB | Facility: HOSPITAL | Age: 22
End: 2017-08-16
Attending: FAMILY MEDICINE
Payer: COMMERCIAL

## 2017-08-16 ENCOUNTER — TELEPHONE (OUTPATIENT)
Dept: INTERNAL MEDICINE | Facility: CLINIC | Age: 22
End: 2017-08-16

## 2017-08-16 VITALS
WEIGHT: 115.06 LBS | BODY MASS INDEX: 21.17 KG/M2 | HEIGHT: 62 IN | DIASTOLIC BLOOD PRESSURE: 94 MMHG | HEART RATE: 98 BPM | OXYGEN SATURATION: 99 % | SYSTOLIC BLOOD PRESSURE: 122 MMHG | TEMPERATURE: 97 F

## 2017-08-16 DIAGNOSIS — R53.83 LOW ENERGY: ICD-10-CM

## 2017-08-16 DIAGNOSIS — F41.9 ANXIETY: Primary | ICD-10-CM

## 2017-08-16 DIAGNOSIS — E55.9 VITAMIN D DEFICIENCY: Primary | ICD-10-CM

## 2017-08-16 DIAGNOSIS — R03.0 ELEVATED BP WITHOUT DIAGNOSIS OF HYPERTENSION: ICD-10-CM

## 2017-08-16 LAB
25(OH)D3+25(OH)D2 SERPL-MCNC: 9 NG/ML
ALBUMIN SERPL BCP-MCNC: 4.1 G/DL
ALP SERPL-CCNC: 76 U/L
ALT SERPL W/O P-5'-P-CCNC: 10 U/L
ANION GAP SERPL CALC-SCNC: 11 MMOL/L
AST SERPL-CCNC: 19 U/L
BASOPHILS # BLD AUTO: 0.04 K/UL
BASOPHILS NFR BLD: 0.6 %
BILIRUB SERPL-MCNC: 0.7 MG/DL
BUN SERPL-MCNC: 5 MG/DL
CALCIUM SERPL-MCNC: 9.3 MG/DL
CHLORIDE SERPL-SCNC: 107 MMOL/L
CO2 SERPL-SCNC: 21 MMOL/L
CREAT SERPL-MCNC: 0.8 MG/DL
DIFFERENTIAL METHOD: ABNORMAL
EOSINOPHIL # BLD AUTO: 0 K/UL
EOSINOPHIL NFR BLD: 0.4 %
ERYTHROCYTE [DISTWIDTH] IN BLOOD BY AUTOMATED COUNT: 15 %
EST. GFR  (AFRICAN AMERICAN): >60 ML/MIN/1.73 M^2
EST. GFR  (NON AFRICAN AMERICAN): >60 ML/MIN/1.73 M^2
GLUCOSE SERPL-MCNC: 100 MG/DL
HCT VFR BLD AUTO: 38.6 %
HGB BLD-MCNC: 13.8 G/DL
LYMPHOCYTES # BLD AUTO: 1.6 K/UL
LYMPHOCYTES NFR BLD: 22.4 %
MCH RBC QN AUTO: 28.7 PG
MCHC RBC AUTO-ENTMCNC: 35.8 G/DL
MCV RBC AUTO: 80 FL
MONOCYTES # BLD AUTO: 0.4 K/UL
MONOCYTES NFR BLD: 5.6 %
NEUTROPHILS # BLD AUTO: 4.9 K/UL
NEUTROPHILS NFR BLD: 70.9 %
PLATELET # BLD AUTO: 385 K/UL
PMV BLD AUTO: 9.4 FL
POTASSIUM SERPL-SCNC: 3.6 MMOL/L
PROT SERPL-MCNC: 7.7 G/DL
RBC # BLD AUTO: 4.81 M/UL
SODIUM SERPL-SCNC: 139 MMOL/L
T4 SERPL-MCNC: 9.1 UG/DL
TSH SERPL DL<=0.005 MIU/L-ACNC: 0.69 UIU/ML
VIT B12 SERPL-MCNC: 1179 PG/ML
WBC # BLD AUTO: 6.97 K/UL

## 2017-08-16 PROCEDURE — 84436 ASSAY OF TOTAL THYROXINE: CPT

## 2017-08-16 PROCEDURE — 99214 OFFICE O/P EST MOD 30 MIN: CPT | Mod: S$GLB,,, | Performed by: FAMILY MEDICINE

## 2017-08-16 PROCEDURE — 3008F BODY MASS INDEX DOCD: CPT | Mod: S$GLB,,, | Performed by: FAMILY MEDICINE

## 2017-08-16 PROCEDURE — 99999 PR PBB SHADOW E&M-EST. PATIENT-LVL III: CPT | Mod: PBBFAC,,, | Performed by: FAMILY MEDICINE

## 2017-08-16 PROCEDURE — 82607 VITAMIN B-12: CPT

## 2017-08-16 PROCEDURE — 82306 VITAMIN D 25 HYDROXY: CPT

## 2017-08-16 PROCEDURE — 84443 ASSAY THYROID STIM HORMONE: CPT

## 2017-08-16 PROCEDURE — 80053 COMPREHEN METABOLIC PANEL: CPT

## 2017-08-16 PROCEDURE — 85025 COMPLETE CBC W/AUTO DIFF WBC: CPT

## 2017-08-16 PROCEDURE — 36415 COLL VENOUS BLD VENIPUNCTURE: CPT

## 2017-08-16 RX ORDER — FLUCONAZOLE 150 MG/1
TABLET ORAL
COMMUNITY
Start: 2017-07-22 | End: 2017-08-16

## 2017-08-16 RX ORDER — BUPROPION HYDROCHLORIDE 150 MG/1
150 TABLET ORAL DAILY
Qty: 90 TABLET | Refills: 3 | Status: SHIPPED | OUTPATIENT
Start: 2017-08-16 | End: 2017-10-17

## 2017-08-16 RX ORDER — HYDROXYZINE HYDROCHLORIDE 10 MG/1
10 TABLET, FILM COATED ORAL 3 TIMES DAILY PRN
Qty: 30 TABLET | Refills: 0 | Status: SHIPPED | OUTPATIENT
Start: 2017-08-16 | End: 2017-10-17

## 2017-08-16 RX ORDER — ESCITALOPRAM OXALATE 20 MG/1
20 TABLET ORAL DAILY
COMMUNITY
End: 2017-08-16 | Stop reason: SDUPTHER

## 2017-08-16 RX ORDER — BUPROPION HYDROCHLORIDE 100 MG/1
100 TABLET ORAL 2 TIMES DAILY
COMMUNITY
End: 2017-08-16

## 2017-08-16 RX ORDER — ESCITALOPRAM OXALATE 20 MG/1
20 TABLET ORAL DAILY
Qty: 90 TABLET | Refills: 1 | Status: SHIPPED | OUTPATIENT
Start: 2017-08-16 | End: 2018-08-10 | Stop reason: SDUPTHER

## 2017-08-16 RX ORDER — ERGOCALCIFEROL 1.25 MG/1
50000 CAPSULE ORAL
Qty: 24 CAPSULE | Refills: 2 | Status: SHIPPED | OUTPATIENT
Start: 2017-08-17 | End: 2017-10-03

## 2017-08-16 NOTE — PROGRESS NOTES
Subjective:       Patient ID: Christopher Webber is a 21 y.o. female.    Chief Complaint: Anxiety    HPI Ms. Webber presents today for anxiety. She states that it has been more than usual and she has not been able to cope on her own for the past couple months. She tried to use other methods to cope. Episodes are random and the last couple weeks it has been the worse. Triggers currently school, work and family. Hasn't been able to sleep.     She has had history of anxiety has been on medication. She was on Effexor but did not like the way that made her feel.   She has been on Wellbutrin and Lexapro for a while. She was on Lexapro first then Wellbutrin was added. It has been about 3 year.   This combination worked well.     Low energy she would like blood work completed today.   She was diagnosed with narcolepsy in the past but never took anything b/c she does not like stimulant medications. She went back recently and was told it was not narcolepsy but excessive sleepiness where she takes her naps and says she is fine.     Review of Systems   See HPI      Objective:      Physical Exam   Constitutional: She appears well-developed and well-nourished.   HENT:   Head: Normocephalic and atraumatic.   Eyes: EOM are normal.   Cardiovascular: Normal heart sounds.    Pulmonary/Chest: Breath sounds normal.   Psychiatric: She has a normal mood and affect. Her behavior is normal.   Vitals reviewed.        Assessment/Plan:   Anxiety  -     hydrOXYzine HCl (ATARAX) 10 MG Tab; Take 1 tablet (10 mg total) by mouth 3 (three) times daily as needed (anxiety).  Dispense: 30 tablet; Refill: 0  -     buPROPion (WELLBUTRIN XL) 150 MG TB24 tablet; Take 1 tablet (150 mg total) by mouth once daily.  Dispense: 90 tablet; Refill: 3  -     escitalopram oxalate (LEXAPRO) 20 MG tablet; Take 1 tablet (20 mg total) by mouth once daily.  Dispense: 90 tablet; Refill: 1  Increased wellbutrin dose today.   Refilled lexapro.   Added Hydroxyzine as needed.  Initially take at night and if tolerated during the day take 2 times a day as needed during the day.     Low energy  -     TSH; Future; Expected date: 08/16/2017  -     Vitamin D; Future; Expected date: 08/16/2017  -     Vitamin B12; Future; Expected date: 08/16/2017  -     CBC auto differential; Future; Expected date: 08/16/2017  -     Comprehensive metabolic panel; Future; Expected date: 08/16/2017  -     T4; Future; Expected date: 08/16/2017    Elevated BP reading without diagnosis of HTN   Patient is anxious today and has to have blood work which makes her even more anxious. She will return in 2 weeks for nurses visit and medication check.     Return if symptoms worsen or fail to improve.    Chantel Bowen MD  [facility]   Family Medicine

## 2017-08-16 NOTE — TELEPHONE ENCOUNTER
----- Message from Ingrid Fajardo sent at 8/15/2017  1:27 PM CDT -----  Contact: pt  Please call pt @ [phone], pt will discuss with nurse.

## 2017-08-17 ENCOUNTER — PATIENT MESSAGE (OUTPATIENT)
Dept: INTERNAL MEDICINE | Facility: CLINIC | Age: 22
End: 2017-08-17

## 2017-08-28 ENCOUNTER — PATIENT MESSAGE (OUTPATIENT)
Dept: OBSTETRICS AND GYNECOLOGY | Facility: CLINIC | Age: 22
End: 2017-08-28

## 2017-10-03 ENCOUNTER — OFFICE VISIT (OUTPATIENT)
Dept: OBSTETRICS AND GYNECOLOGY | Facility: CLINIC | Age: 22
End: 2017-10-03
Payer: COMMERCIAL

## 2017-10-03 VITALS
HEIGHT: 62 IN | DIASTOLIC BLOOD PRESSURE: 66 MMHG | WEIGHT: 119.06 LBS | SYSTOLIC BLOOD PRESSURE: 120 MMHG | BODY MASS INDEX: 21.91 KG/M2

## 2017-10-03 DIAGNOSIS — R10.2 PELVIC PAIN IN FEMALE: ICD-10-CM

## 2017-10-03 DIAGNOSIS — Z71.1 CONCERN ABOUT STD IN FEMALE WITHOUT DIAGNOSIS: Primary | ICD-10-CM

## 2017-10-03 PROCEDURE — 87591 N.GONORRHOEAE DNA AMP PROB: CPT

## 2017-10-03 PROCEDURE — 99213 OFFICE O/P EST LOW 20 MIN: CPT | Mod: S$GLB,,, | Performed by: NURSE PRACTITIONER

## 2017-10-03 PROCEDURE — 99999 PR PBB SHADOW E&M-EST. PATIENT-LVL III: CPT | Mod: PBBFAC,,, | Performed by: NURSE PRACTITIONER

## 2017-10-03 NOTE — PROGRESS NOTES
"CC: Pelvic pain    Christopher Webber is a 21 y.o. female  presents for c/o pelvic pain .  LMP: Patient's last menstrual period was 2017..  No issues, problems, or complaints.  Last pap exam was normal,2017. Patient is concerned that she has been exposed to STD. Patient has had mild pelvic pain for 2 months.      Past Medical History:   Diagnosis Date    Narcolepsy      Past Surgical History:   Procedure Laterality Date    tonsilectomy       Social History     Social History    Marital status: Single     Spouse name: N/A    Number of children: N/A    Years of education: N/A     Occupational History    Not on file.     Social History Main Topics    Smoking status: Never Smoker    Smokeless tobacco: Never Used    Alcohol use No      Comment: social    Drug use: No    Sexual activity: Yes     Partners: Male     Birth control/ protection: None, Condom     Other Topics Concern    Not on file     Social History Narrative    No narrative on file     Family History   Problem Relation Age of Onset    Breast cancer Neg Hx     Cancer Neg Hx     Colon cancer Neg Hx     Diabetes Neg Hx     Eclampsia Neg Hx     Hypertension Neg Hx     Miscarriages / Stillbirths Neg Hx      labor Neg Hx     Ovarian cancer Neg Hx     Stroke Neg Hx      OB History      Para Term  AB Living    0 0 0 0 0 0    SAB TAB Ectopic Multiple Live Births    0 0 0 0            /66 (BP Location: Right arm, Patient Position: Sitting, BP Method: Medium (Manual))   Ht 5' 2" (1.575 m)   Wt 54 kg (119 lb 0.8 oz)   LMP 2017   BMI 21.77 kg/m²       ROS:  GENERAL: Denies weight gain or weight loss. Feeling well overall.   SKIN: Denies rash or lesions.   HEAD: Denies head injury or headache.   NODES: Denies enlarged lymph nodes.   CHEST: Denies chest pain or shortness of breath.   CARDIOVASCULAR: Denies palpitations or left sided chest pain.   ABDOMEN: HPI  URINARY: No frequency, dysuria, hematuria, or " burning on urination.  REPRODUCTIVE: See HPI.     PHYSICAL EXAM:  APPEARANCE: Well nourished, well developed, in no acute distress.  ABDOMEN: Soft.  No tenderness or masses    PELVIC:  Adequate perineal body, normal urethral meatus.  Vagina moist and well rugated without lesions or discharge.  Cervix pink, without lesions, discharge or tenderness.  Bimanual exam shows uterus to be normal size, regular, mobile and nontender.  Adnexa without masses or tenderness.        1. Concern about STD in female without diagnosis  C. trachomatis/N. gonorrhoeae by AMP DNA Cervicovaginal    Hepatitis panel, acute    HIV-1 and HIV-2 antibodies    RPR    US Pelvis Limited Non OB   2. Pelvic pain in female  US Pelvis Limited Non OB     PLAN:  Exam is unremarkable  Pelvic ultrasound  STD work-up      Patient was counseled today on A.C.S. Pap guidelines and recommendations for yearly pelvic exams, mammograms and monthly self breast exams; to see her PCP for other health maintenance.

## 2017-10-04 ENCOUNTER — PATIENT MESSAGE (OUTPATIENT)
Dept: OBSTETRICS AND GYNECOLOGY | Facility: CLINIC | Age: 22
End: 2017-10-04

## 2017-10-04 ENCOUNTER — HOSPITAL ENCOUNTER (OUTPATIENT)
Dept: RADIOLOGY | Facility: HOSPITAL | Age: 22
Discharge: HOME | End: 2017-10-04
Attending: NURSE PRACTITIONER
Payer: COMMERCIAL

## 2017-10-04 DIAGNOSIS — Z71.1 CONCERN ABOUT STD IN FEMALE WITHOUT DIAGNOSIS: ICD-10-CM

## 2017-10-04 DIAGNOSIS — R10.2 PELVIC PAIN IN FEMALE: ICD-10-CM

## 2017-10-04 DIAGNOSIS — N83.202 CYST OF LEFT OVARY: Primary | ICD-10-CM

## 2017-10-04 LAB
C TRACH DNA SPEC QL NAA+PROBE: NOT DETECTED
N GONORRHOEA DNA SPEC QL NAA+PROBE: NOT DETECTED

## 2017-10-04 PROCEDURE — 76830 TRANSVAGINAL US NON-OB: CPT | Mod: 26,,, | Performed by: RADIOLOGY

## 2017-10-04 PROCEDURE — 76856 US EXAM PELVIC COMPLETE: CPT | Mod: 26,,, | Performed by: RADIOLOGY

## 2017-10-04 PROCEDURE — 76856 US EXAM PELVIC COMPLETE: CPT | Mod: TC,PO

## 2017-10-04 NOTE — PROGRESS NOTES
Please call patient and set up repeat pelvic ultrasound in 6-8 weeks related to 2.1 cm complex LEFT ovarian cyst.

## 2017-10-05 ENCOUNTER — TELEPHONE (OUTPATIENT)
Dept: OBSTETRICS AND GYNECOLOGY | Facility: CLINIC | Age: 22
End: 2017-10-05

## 2017-10-05 NOTE — TELEPHONE ENCOUNTER
----- Message from Sharon Garcia NP sent at 10/5/2017  8:06 AM CDT -----  Please call her and set up repeat pelvic ultrasound

## 2017-10-06 ENCOUNTER — TELEPHONE (OUTPATIENT)
Dept: OBSTETRICS AND GYNECOLOGY | Facility: CLINIC | Age: 22
End: 2017-10-06

## 2017-10-06 NOTE — TELEPHONE ENCOUNTER
Patient stated that she missed a call from the office, I informed the patient that it looks like we were calling to schedule follow up ultrasound however it has already bee scheduled for 11/13/2017 at 2:45pm.  Patient voiced understanding.

## 2017-10-06 NOTE — TELEPHONE ENCOUNTER
----- Message from Lisbet Ashley sent at 10/6/2017  3:24 PM CDT -----  Contact: self   Patient returning call. Please call back at [phone].      Thanks,  Lisbet Ashley

## 2017-10-17 ENCOUNTER — OFFICE VISIT (OUTPATIENT)
Dept: OBSTETRICS AND GYNECOLOGY | Facility: CLINIC | Age: 22
End: 2017-10-17
Payer: COMMERCIAL

## 2017-10-17 VITALS
SYSTOLIC BLOOD PRESSURE: 118 MMHG | DIASTOLIC BLOOD PRESSURE: 70 MMHG | BODY MASS INDEX: 22.84 KG/M2 | HEIGHT: 62 IN | WEIGHT: 124.13 LBS

## 2017-10-17 DIAGNOSIS — Z34.00 SUPERVISION OF NORMAL FIRST PREGNANCY, ANTEPARTUM: Primary | ICD-10-CM

## 2017-10-17 LAB
B-HCG UR QL: POSITIVE
BILIRUB UR QL STRIP: NEGATIVE
CLARITY UR REFRACT.AUTO: ABNORMAL
COLOR UR AUTO: YELLOW
CTP QC/QA: YES
GLUCOSE UR QL STRIP: NEGATIVE
HGB UR QL STRIP: NEGATIVE
KETONES UR QL STRIP: NEGATIVE
LEUKOCYTE ESTERASE UR QL STRIP: NEGATIVE
NITRITE UR QL STRIP: NEGATIVE
PH UR STRIP: 5 [PH] (ref 5–8)
PROT UR QL STRIP: NEGATIVE
SP GR UR STRIP: 1.01 (ref 1–1.03)
URN SPEC COLLECT METH UR: ABNORMAL
UROBILINOGEN UR STRIP-ACNC: NEGATIVE EU/DL

## 2017-10-17 PROCEDURE — 81003 URINALYSIS AUTO W/O SCOPE: CPT

## 2017-10-17 PROCEDURE — 87086 URINE CULTURE/COLONY COUNT: CPT

## 2017-10-17 PROCEDURE — 99213 OFFICE O/P EST LOW 20 MIN: CPT | Mod: S$GLB,,, | Performed by: NURSE PRACTITIONER

## 2017-10-17 PROCEDURE — 81025 URINE PREGNANCY TEST: CPT | Mod: S$GLB,,, | Performed by: NURSE PRACTITIONER

## 2017-10-17 PROCEDURE — 87591 N.GONORRHOEAE DNA AMP PROB: CPT

## 2017-10-17 PROCEDURE — 99999 PR PBB SHADOW E&M-EST. PATIENT-LVL III: CPT | Mod: PBBFAC,,, | Performed by: NURSE PRACTITIONER

## 2017-10-17 RX ORDER — HYDROXYZINE HYDROCHLORIDE 10 MG/1
25 TABLET, FILM COATED ORAL 3 TIMES DAILY PRN
COMMUNITY
End: 2017-10-26

## 2017-10-17 RX ORDER — GINSENG 100 MG
175 CAPSULE ORAL EVERY MORNING
COMMUNITY
End: 2017-10-26

## 2017-10-17 RX ORDER — BUPROPION HYDROCHLORIDE 150 MG/1
150 TABLET ORAL DAILY
COMMUNITY
End: 2017-10-26

## 2017-10-17 NOTE — PROGRESS NOTES
"CC: Risk assessment    Christopher Webber is a 21 y.o. female  presents for risk assessment.  LMP: Patient's last menstrual period was 2017.. 5 w 1 d GURVINDER /-. Last pap exam was normal, . Patient has a complex cyst left ovary per ultrasound 6 week ago. Patient does report slight cramping, no vaginal bleeding. Patient has a h/o anxiety and depression.     Past Medical History:   Diagnosis Date    Narcolepsy      Past Surgical History:   Procedure Laterality Date    tonsilectomy       Social History     Social History    Marital status: Single     Spouse name: N/A    Number of children: N/A    Years of education: N/A     Occupational History    Not on file.     Social History Main Topics    Smoking status: Never Smoker    Smokeless tobacco: Never Used    Alcohol use 0.0 oz/week      Comment: social    Drug use: No    Sexual activity: Yes     Partners: Male     Birth control/ protection: None, Condom     Other Topics Concern    Not on file     Social History Narrative    No narrative on file     Family History   Problem Relation Age of Onset    Breast cancer Neg Hx     Cancer Neg Hx     Colon cancer Neg Hx     Diabetes Neg Hx     Eclampsia Neg Hx     Hypertension Neg Hx     Miscarriages / Stillbirths Neg Hx      labor Neg Hx     Ovarian cancer Neg Hx     Stroke Neg Hx      OB History      Para Term  AB Living    1 0 0 0 0 0    SAB TAB Ectopic Multiple Live Births    0 0 0 0            /70 (BP Location: Left arm, Patient Position: Sitting, BP Method: Medium (Manual))   Ht 5' 2" (1.575 m)   Wt 56.3 kg (124 lb 1.9 oz)   LMP 2017   BMI 22.70 kg/m²       ROS:  GENERAL: Denies weight gain or weight loss. Feeling well overall.   SKIN: Denies rash or lesions.   HEAD: Denies head injury or headache.   NODES: Denies enlarged lymph nodes.   CHEST: Denies chest pain or shortness of breath.   CARDIOVASCULAR: Denies palpitations or left sided chest pain. "   ABDOMEN: No abdominal pain, constipation, diarrhea, nausea, vomiting or rectal bleeding.   URINARY: No frequency, dysuria, hematuria, or burning on urination.  REPRODUCTIVE: See HPI.   BREASTS: The patient performs breast self-examination and denies pain, lumps, or nipple discharge.   HEMATOLOGIC: No easy bruisability or excessive bleeding.   MUSCULOSKELETAL: Denies joint pain or swelling.   NEUROLOGIC: Denies syncope or weakness.   PSYCHIATRIC: HPI    PHYSICAL EXAM:  APPEARANCE: Well nourished, well developed, in no acute distress.  CHEST: Good respiratory effect  ABDOMEN: Soft.  No tenderness or masses.  PELVIC: Normal external genitalia without lesions. Vagina moist and well rugated without lesions or discharge.  Cervix pink, without lesions, discharge or tendernes Bimanual exam shows uterus to be 6 week. Adnexa without masses or tenderness.    EXTREMITIES: No edema.    1. Supervision of normal first pregnancy, antepartum  C. trachomatis/N. gonorrhoeae by AMP DNA Cervicovaginal    POCT urine pregnancy    Rubella antibody, IgG    Type & Screen - Ob Profile    Urinalysis    Urine culture    US OB/GYN Procedure ([facility])    CBC auto differential    Glucose, fasting    SICKLE CELL SCREEN    PLAN:  Patient aware of the risk and benefits of depression and anxiety meds.  Pelvic rest ( see HPI), will report increase cramping or vaginal bleeding  Ultrasound, labs and OB visit.

## 2017-10-18 LAB
BACTERIA UR CULT: NORMAL
C TRACH DNA SPEC QL NAA+PROBE: NOT DETECTED
N GONORRHOEA DNA SPEC QL NAA+PROBE: NOT DETECTED

## 2017-10-23 ENCOUNTER — TELEPHONE (OUTPATIENT)
Dept: OBSTETRICS AND GYNECOLOGY | Facility: CLINIC | Age: 22
End: 2017-10-23

## 2017-10-23 ENCOUNTER — PATIENT MESSAGE (OUTPATIENT)
Dept: OBSTETRICS AND GYNECOLOGY | Facility: CLINIC | Age: 22
End: 2017-10-23

## 2017-10-23 NOTE — TELEPHONE ENCOUNTER
----- Message from Aby Gunderson sent at 10/23/2017  6:29 AM CDT -----  Contact: Pt [facility] portal  Appointment Request From: Christopher Webber    With Provider: Other - (see comments) [oth]    Would Accept With:Request to schedule a test or procedure    Preferred Date Range: Any date 10/20/2017 or later    Preferred Times: Any    Reason for visit: Request an Appt    Comments:  This week I was there and confirmed my pregnancy. I would like to schedule just an ultrasound as soon as possible to confirm my date of conception. Its really important right now. Thank you.    Pt can be reached at [phone].

## 2017-10-23 NOTE — TELEPHONE ENCOUNTER
Tried to contact pt on scheduling appt, no answer scheduled her 1st available and sent a portal message.  Myriam Fnin

## 2017-10-26 ENCOUNTER — INITIAL PRENATAL (OUTPATIENT)
Dept: OBSTETRICS AND GYNECOLOGY | Facility: CLINIC | Age: 22
End: 2017-10-26
Payer: COMMERCIAL

## 2017-10-26 ENCOUNTER — PROCEDURE VISIT (OUTPATIENT)
Dept: OBSTETRICS AND GYNECOLOGY | Facility: CLINIC | Age: 22
End: 2017-10-26
Payer: COMMERCIAL

## 2017-10-26 VITALS — SYSTOLIC BLOOD PRESSURE: 130 MMHG | BODY MASS INDEX: 22.1 KG/M2 | DIASTOLIC BLOOD PRESSURE: 96 MMHG | WEIGHT: 120.81 LBS

## 2017-10-26 DIAGNOSIS — Z34.01 ENCOUNTER FOR SUPERVISION OF NORMAL FIRST PREGNANCY IN FIRST TRIMESTER: Primary | ICD-10-CM

## 2017-10-26 DIAGNOSIS — Z34.00 SUPERVISION OF NORMAL FIRST PREGNANCY, ANTEPARTUM: ICD-10-CM

## 2017-10-26 PROCEDURE — 76801 OB US < 14 WKS SINGLE FETUS: CPT | Mod: S$GLB,,, | Performed by: OBSTETRICS & GYNECOLOGY

## 2017-10-26 PROCEDURE — 0500F INITIAL PRENATAL CARE VISIT: CPT | Mod: S$GLB,,, | Performed by: ADVANCED PRACTICE MIDWIFE

## 2017-10-26 PROCEDURE — 99999 PR PBB SHADOW E&M-EST. PATIENT-LVL II: CPT | Mod: PBBFAC,,, | Performed by: ADVANCED PRACTICE MIDWIFE

## 2017-10-27 PROBLEM — Z34.01 ENCOUNTER FOR SUPERVISION OF NORMAL FIRST PREGNANCY IN FIRST TRIMESTER: Status: ACTIVE | Noted: 2017-10-27

## 2017-10-27 NOTE — PROGRESS NOTES
Pt here for new ob visit  Oriented to the practice including [facility]IAN[facility]/MD collaboration  Instructed to go downstairs for labs today   Introduced to [facility] danya  Blue bag materials reviewed  Warning signs discussed.  Ultrasound today consistent with LMP dating   Embryo WNL, reviewed with pt  Pt c/o nausea, rare vomiting, has not tried anything yet, suggest Vitamin B6, eating small frequent meals

## 2017-11-02 DIAGNOSIS — O21.9 NAUSEA AND VOMITING DURING PREGNANCY: Primary | ICD-10-CM

## 2017-11-02 RX ORDER — PROMETHAZINE HYDROCHLORIDE 12.5 MG/1
12.5 TABLET ORAL 4 TIMES DAILY
Qty: 40 TABLET | Refills: 3 | Status: SHIPPED | OUTPATIENT
Start: 2017-11-02 | End: 2018-07-13

## 2017-11-08 ENCOUNTER — PATIENT MESSAGE (OUTPATIENT)
Dept: OBSTETRICS AND GYNECOLOGY | Facility: CLINIC | Age: 22
End: 2017-11-08

## 2018-06-29 ENCOUNTER — PATIENT OUTREACH (OUTPATIENT)
Dept: ADMINISTRATIVE | Facility: HOSPITAL | Age: 23
End: 2018-06-29

## 2018-07-13 ENCOUNTER — OFFICE VISIT (OUTPATIENT)
Dept: INTERNAL MEDICINE | Facility: CLINIC | Age: 23
End: 2018-07-13
Payer: MEDICAID

## 2018-07-13 VITALS
DIASTOLIC BLOOD PRESSURE: 88 MMHG | HEART RATE: 80 BPM | HEIGHT: 62 IN | TEMPERATURE: 97 F | WEIGHT: 113.13 LBS | BODY MASS INDEX: 20.82 KG/M2 | OXYGEN SATURATION: 97 % | SYSTOLIC BLOOD PRESSURE: 118 MMHG

## 2018-07-13 DIAGNOSIS — F41.9 ANXIETY: Primary | ICD-10-CM

## 2018-07-13 PROCEDURE — 99999 PR PBB SHADOW E&M-EST. PATIENT-LVL III: CPT | Mod: PBBFAC,,, | Performed by: FAMILY MEDICINE

## 2018-07-13 PROCEDURE — 99213 OFFICE O/P EST LOW 20 MIN: CPT | Mod: S$PBB,,, | Performed by: FAMILY MEDICINE

## 2018-07-13 PROCEDURE — 99213 OFFICE O/P EST LOW 20 MIN: CPT | Mod: PBBFAC | Performed by: FAMILY MEDICINE

## 2018-07-13 RX ORDER — BUPROPION HYDROCHLORIDE 150 MG/1
150 TABLET ORAL DAILY
Qty: 30 TABLET | Refills: 11 | Status: SHIPPED | OUTPATIENT
Start: 2018-07-13 | End: 2018-12-29

## 2018-07-13 RX ORDER — ESCITALOPRAM OXALATE 10 MG/1
10 TABLET ORAL DAILY
Qty: 30 TABLET | Refills: 0 | Status: SHIPPED | OUTPATIENT
Start: 2018-07-13 | End: 2018-08-10

## 2018-07-13 NOTE — PROGRESS NOTES
Subjective:       Patient ID: Christopher Webber is a 22 y.o. female.    Chief Complaint: Anxiety (restart on meds after PG)    HPI Ms. Webber presents today for Anxiety. She stopped medication when she was pregnant. She has twin girls born in April. Was on anxiety medication lexapro. Wants to start back on her medication.       Review of Systems   Constitutional: Negative.    Respiratory: Negative.    Gastrointestinal: Negative.    Psychiatric/Behavioral: Negative.         Objective:        Physical Exam   Constitutional: She is oriented to person, place, and time. She appears well-developed and well-nourished.   HENT:   Head: Normocephalic and atraumatic.   Cardiovascular: Normal heart sounds.    Pulmonary/Chest: Breath sounds normal.   Neurological: She is alert and oriented to person, place, and time.   Vitals reviewed.        Assessment/Plan:     Anxiety  -     buPROPion (WELLBUTRIN XL) 150 MG TB24 tablet; Take 1 tablet (150 mg total) by mouth once daily.  Dispense: 30 tablet; Refill: 11  -     escitalopram oxalate (LEXAPRO) 10 MG tablet; Take 1 tablet (10 mg total) by mouth once daily.  Dispense: 30 tablet; Refill: 0      Restarted medication today.     Follow-up in about 2 months (around 9/13/2018).    Chantel Bowen MD  [facility]   Family Medicine

## 2018-08-10 ENCOUNTER — PATIENT MESSAGE (OUTPATIENT)
Dept: INTERNAL MEDICINE | Facility: CLINIC | Age: 23
End: 2018-08-10

## 2018-08-10 DIAGNOSIS — F41.9 ANXIETY: ICD-10-CM

## 2018-08-10 RX ORDER — ESCITALOPRAM OXALATE 20 MG/1
20 TABLET ORAL DAILY
Qty: 90 TABLET | Refills: 1 | Status: SHIPPED | OUTPATIENT
Start: 2018-08-10 | End: 2019-09-10

## 2018-09-16 ENCOUNTER — PATIENT MESSAGE (OUTPATIENT)
Dept: INTERNAL MEDICINE | Facility: CLINIC | Age: 23
End: 2018-09-16

## 2018-09-21 ENCOUNTER — OFFICE VISIT (OUTPATIENT)
Dept: INTERNAL MEDICINE | Facility: CLINIC | Age: 23
End: 2018-09-21
Payer: MEDICAID

## 2018-09-21 VITALS
HEART RATE: 90 BPM | HEIGHT: 62 IN | RESPIRATION RATE: 20 BRPM | DIASTOLIC BLOOD PRESSURE: 88 MMHG | SYSTOLIC BLOOD PRESSURE: 130 MMHG | BODY MASS INDEX: 21.83 KG/M2 | WEIGHT: 118.63 LBS | TEMPERATURE: 99 F | OXYGEN SATURATION: 99 %

## 2018-09-21 DIAGNOSIS — F41.9 ANXIETY: Primary | ICD-10-CM

## 2018-09-21 PROCEDURE — 90686 IIV4 VACC NO PRSV 0.5 ML IM: CPT | Mod: PBBFAC

## 2018-09-21 PROCEDURE — 99213 OFFICE O/P EST LOW 20 MIN: CPT | Mod: PBBFAC,25 | Performed by: FAMILY MEDICINE

## 2018-09-21 PROCEDURE — 99213 OFFICE O/P EST LOW 20 MIN: CPT | Mod: S$PBB,,, | Performed by: FAMILY MEDICINE

## 2018-09-21 PROCEDURE — 99999 PR PBB SHADOW E&M-EST. PATIENT-LVL III: CPT | Mod: PBBFAC,,, | Performed by: FAMILY MEDICINE

## 2018-09-21 RX ORDER — BUSPIRONE HYDROCHLORIDE 5 MG/1
5 TABLET ORAL 2 TIMES DAILY PRN
Qty: 60 TABLET | Refills: 1 | Status: SHIPPED | OUTPATIENT
Start: 2018-09-21 | End: 2019-09-10

## 2018-09-21 RX ORDER — BUSPIRONE HYDROCHLORIDE 5 MG/1
5 TABLET ORAL 2 TIMES DAILY PRN
Qty: 60 TABLET | Refills: 1 | Status: SHIPPED | OUTPATIENT
Start: 2018-09-21 | End: 2018-09-21 | Stop reason: SDUPTHER

## 2018-09-21 NOTE — PROGRESS NOTES
Subjective:       Patient ID: Christopher Webber is a 22 y.o. female.    Chief Complaint: Medication Refill    HPI Christopher presents today for medication refill. Wellbutrin and lexapro was helping when she first had it.   She does not feel different from months ago when she restarted.     Anxiety   Difficult to go to sleep.     Preeclampsia- noted after pregnancy elevated pressures.     Review of Systems   Constitutional: Negative.    Respiratory: Negative.    Cardiovascular: Negative.    Musculoskeletal: Negative.    Psychiatric/Behavioral: Positive for decreased concentration, dysphoric mood and sleep disturbance.         Objective:        Physical Exam   Constitutional: She is oriented to person, place, and time. She appears well-developed and well-nourished.   HENT:   Head: Normocephalic.   Cardiovascular: Normal heart sounds.   Pulmonary/Chest: Breath sounds normal.   Neurological: She is alert and oriented to person, place, and time.   Vitals reviewed.     Assessment/Plan:     Anxiety  -     busPIRone (BUSPAR) 5 MG Tab; Take 1 tablet (5 mg total) by mouth 2 (two) times daily as needed (anxiety).  Dispense: 60 tablet; Refill: 1    Other orders  -     Discontinue: busPIRone (BUSPAR) 5 MG Tab; Take 1 tablet (5 mg total) by mouth 2 (two) times daily as needed (anxiety).  Dispense: 60 tablet; Refill: 1  -     Influenza - Quadrivalent (3 years & older) (PF)      Stopping Wellbutrin today  Patient to inform me if she has any problems with coming off this medication    Follow-up in about 6 weeks (around 11/2/2018).    Chantel Bowen MD  [facility]   Family Medicine

## 2018-10-10 ENCOUNTER — PATIENT MESSAGE (OUTPATIENT)
Dept: INTERNAL MEDICINE | Facility: CLINIC | Age: 23
End: 2018-10-10

## 2018-10-22 ENCOUNTER — PATIENT MESSAGE (OUTPATIENT)
Dept: INTERNAL MEDICINE | Facility: CLINIC | Age: 23
End: 2018-10-22

## 2018-11-02 ENCOUNTER — PATIENT MESSAGE (OUTPATIENT)
Dept: INTERNAL MEDICINE | Facility: CLINIC | Age: 23
End: 2018-11-02

## 2018-11-02 ENCOUNTER — OFFICE VISIT (OUTPATIENT)
Dept: INTERNAL MEDICINE | Facility: CLINIC | Age: 23
End: 2018-11-02
Payer: MEDICAID

## 2018-11-02 ENCOUNTER — LAB VISIT (OUTPATIENT)
Dept: LAB | Facility: HOSPITAL | Age: 23
End: 2018-11-02
Attending: FAMILY MEDICINE
Payer: MEDICAID

## 2018-11-02 VITALS
TEMPERATURE: 97 F | WEIGHT: 124.31 LBS | HEART RATE: 90 BPM | BODY MASS INDEX: 22.88 KG/M2 | HEIGHT: 62 IN | OXYGEN SATURATION: 99 % | SYSTOLIC BLOOD PRESSURE: 150 MMHG | DIASTOLIC BLOOD PRESSURE: 102 MMHG

## 2018-11-02 DIAGNOSIS — F41.9 ANXIETY AND DEPRESSION: ICD-10-CM

## 2018-11-02 DIAGNOSIS — F32.A ANXIETY AND DEPRESSION: ICD-10-CM

## 2018-11-02 DIAGNOSIS — R74.02 ELEVATED LDH: ICD-10-CM

## 2018-11-02 DIAGNOSIS — E55.9 VITAMIN D DEFICIENCY: ICD-10-CM

## 2018-11-02 DIAGNOSIS — R74.8 ELEVATED VITAMIN B12 LEVEL: ICD-10-CM

## 2018-11-02 DIAGNOSIS — K58.8 OTHER IRRITABLE BOWEL SYNDROME: ICD-10-CM

## 2018-11-02 DIAGNOSIS — E55.9 VITAMIN D DEFICIENCY: Primary | ICD-10-CM

## 2018-11-02 DIAGNOSIS — I10 ESSENTIAL HYPERTENSION: ICD-10-CM

## 2018-11-02 LAB
25(OH)D3+25(OH)D2 SERPL-MCNC: 16 NG/ML
ALBUMIN SERPL BCP-MCNC: 3.9 G/DL
ALP SERPL-CCNC: 75 U/L
ALT SERPL W/O P-5'-P-CCNC: 12 U/L
ANION GAP SERPL CALC-SCNC: 6 MMOL/L
AST SERPL-CCNC: 22 U/L
BASOPHILS # BLD AUTO: 0.04 K/UL
BASOPHILS NFR BLD: 0.7 %
BILIRUB SERPL-MCNC: 0.4 MG/DL
BUN SERPL-MCNC: 6 MG/DL
CALCIUM SERPL-MCNC: 9.7 MG/DL
CHLORIDE SERPL-SCNC: 110 MMOL/L
CO2 SERPL-SCNC: 22 MMOL/L
CREAT SERPL-MCNC: 0.8 MG/DL
CRP SERPL-MCNC: 0.3 MG/L
DIFFERENTIAL METHOD: ABNORMAL
EOSINOPHIL # BLD AUTO: 0 K/UL
EOSINOPHIL NFR BLD: 0.7 %
ERYTHROCYTE [DISTWIDTH] IN BLOOD BY AUTOMATED COUNT: 16.2 %
EST. GFR  (AFRICAN AMERICAN): >60 ML/MIN/1.73 M^2
EST. GFR  (NON AFRICAN AMERICAN): >60 ML/MIN/1.73 M^2
ESTIMATED AVG GLUCOSE: 97 MG/DL
FSH SERPL-ACNC: 4.2 MIU/ML
GLUCOSE SERPL-MCNC: 102 MG/DL
HBA1C MFR BLD HPLC: 5 %
HCT VFR BLD AUTO: 36.3 %
HGB BLD-MCNC: 12.2 G/DL
IMM GRANULOCYTES # BLD AUTO: 0.02 K/UL
IMM GRANULOCYTES NFR BLD AUTO: 0.3 %
LDH SERPL L TO P-CCNC: 243 U/L
LH SERPL-ACNC: 2.8 MIU/ML
LYMPHOCYTES # BLD AUTO: 1.8 K/UL
LYMPHOCYTES NFR BLD: 30.6 %
MCH RBC QN AUTO: 26.1 PG
MCHC RBC AUTO-ENTMCNC: 33.6 G/DL
MCV RBC AUTO: 78 FL
MONOCYTES # BLD AUTO: 0.5 K/UL
MONOCYTES NFR BLD: 8.8 %
NEUTROPHILS # BLD AUTO: 3.5 K/UL
NEUTROPHILS NFR BLD: 58.9 %
NRBC BLD-RTO: 0 /100 WBC
PLATELET # BLD AUTO: 377 K/UL
PMV BLD AUTO: 10.2 FL
POTASSIUM SERPL-SCNC: 3.7 MMOL/L
PROT SERPL-MCNC: 7.3 G/DL
RBC # BLD AUTO: 4.67 M/UL
SODIUM SERPL-SCNC: 138 MMOL/L
T4 FREE SERPL-MCNC: 1 NG/DL
TSH SERPL DL<=0.005 MIU/L-ACNC: 1.08 UIU/ML
VIT B12 SERPL-MCNC: 1353 PG/ML
WBC # BLD AUTO: 5.99 K/UL

## 2018-11-02 PROCEDURE — 82607 VITAMIN B-12: CPT

## 2018-11-02 PROCEDURE — 36415 COLL VENOUS BLD VENIPUNCTURE: CPT

## 2018-11-02 PROCEDURE — 99999 PR PBB SHADOW E&M-EST. PATIENT-LVL III: CPT | Mod: PBBFAC,,, | Performed by: FAMILY MEDICINE

## 2018-11-02 PROCEDURE — 85025 COMPLETE CBC W/AUTO DIFF WBC: CPT

## 2018-11-02 PROCEDURE — 84443 ASSAY THYROID STIM HORMONE: CPT

## 2018-11-02 PROCEDURE — 84439 ASSAY OF FREE THYROXINE: CPT

## 2018-11-02 PROCEDURE — 82672 ASSAY OF ESTROGEN: CPT

## 2018-11-02 PROCEDURE — 83036 HEMOGLOBIN GLYCOSYLATED A1C: CPT

## 2018-11-02 PROCEDURE — 99214 OFFICE O/P EST MOD 30 MIN: CPT | Mod: S$PBB,,, | Performed by: FAMILY MEDICINE

## 2018-11-02 PROCEDURE — 80053 COMPREHEN METABOLIC PANEL: CPT

## 2018-11-02 PROCEDURE — 83615 LACTATE (LD) (LDH) ENZYME: CPT

## 2018-11-02 PROCEDURE — 86140 C-REACTIVE PROTEIN: CPT

## 2018-11-02 PROCEDURE — 83002 ASSAY OF GONADOTROPIN (LH): CPT

## 2018-11-02 PROCEDURE — 83001 ASSAY OF GONADOTROPIN (FSH): CPT

## 2018-11-02 PROCEDURE — 99213 OFFICE O/P EST LOW 20 MIN: CPT | Mod: PBBFAC | Performed by: FAMILY MEDICINE

## 2018-11-02 PROCEDURE — 82306 VITAMIN D 25 HYDROXY: CPT

## 2018-11-02 RX ORDER — QUETIAPINE FUMARATE 25 MG/1
25 TABLET, FILM COATED ORAL NIGHTLY
Qty: 90 TABLET | Refills: 1 | Status: SHIPPED | OUTPATIENT
Start: 2018-11-02 | End: 2019-09-10

## 2018-11-02 RX ORDER — AMLODIPINE BESYLATE 2.5 MG/1
2.5 TABLET ORAL DAILY
Qty: 90 TABLET | Refills: 0 | Status: SHIPPED | OUTPATIENT
Start: 2018-11-02 | End: 2019-02-28 | Stop reason: SDUPTHER

## 2018-11-02 NOTE — PROGRESS NOTES
Subjective:       Patient ID: Christopher Webber is a 22 y.o. female.    Chief Complaint: Follow-up (6 wk f/u anxiety)    HPI Ms. Webber presents today for follow up anxiety. After her daughters were born she started back on her wellbutrin and lexepro.   She later felt this was not helping as much as it had been.   We started Buspar as needed for breakthrough anxiety.   Buspar gives her bad dreams.     She feels fine some days and some days she is not.   Other days dramatic    Grandmother bipolar depression  Mom-depression    Wakes up checking on her twins all through the night  Know of SIDS episodes with her twins uncle (who was a twin)-and with her twin group she belongs to.    Stopping buspar  Stopping lexapro over next 2 weeks    LDH was 787 the last time it was checked  She would like this repeated and other labs .  Nov 14th appt with counselor but she doesn't give medication.    Starting medication for her blood pressure. It has not seemed to come back to normal since her twins were born.     Review of Systems   Constitutional: Negative.    HENT: Negative.    Musculoskeletal: Negative.    Psychiatric/Behavioral: Positive for agitation and sleep disturbance. The patient is nervous/anxious.          Past Medical History:   Diagnosis Date    Narcolepsy      Objective:        Physical Exam   Constitutional: She is oriented to person, place, and time. She appears well-developed and well-nourished.   HENT:   Head: Normocephalic and atraumatic.   Cardiovascular: Normal heart sounds.   Pulmonary/Chest: Breath sounds normal.   Musculoskeletal: Normal range of motion.   Neurological: She is alert and oriented to person, place, and time.   Vitals reviewed.      Assessment/Plan:     Vitamin D deficiency  -     Vitamin D; Future; Expected date: 11/02/2018    Anxiety and depression  -     Comprehensive metabolic panel; Future; Expected date: 11/02/2018  -     CBC auto differential; Future; Expected date: 11/02/2018  -      QUEtiapine (SEROQUEL) 25 MG Tab; Take 1 tablet (25 mg total) by mouth every evening.  Dispense: 90 tablet; Refill: 1  -     Follicle stimulating hormone; Future; Expected date: 11/02/2018  -     TSH; Future; Expected date: 11/02/2018  -     T4, free; Future; Expected date: 11/02/2018  -     Luteinizing hormone; Future; Expected date: 11/02/2018  -     ESTROGENS, TOTAL; Future; Expected date: 11/02/2018    Elevated vitamin B12 level  -     Vitamin B12; Future; Expected date: 11/02/2018  -     Hemoglobin A1c; Future; Expected date: 11/02/2018    Elevated LDH  -     Lactate dehydrogenase; Future; Expected date: 11/02/2018  -     Follicle stimulating hormone; Future; Expected date: 11/02/2018  -     TSH; Future; Expected date: 11/02/2018  -     T4, free; Future; Expected date: 11/02/2018  -     Luteinizing hormone; Future; Expected date: 11/02/2018  -     C-reactive protein; Future; Expected date: 11/02/2018    Other irritable bowel syndrome  -     C-reactive protein; Future; Expected date: 11/02/2018    Essential hypertension  -     amLODIPine (NORVASC) 2.5 MG tablet; Take 1 tablet (2.5 mg total) by mouth once daily.  Dispense: 90 tablet; Refill: 0    We are starting seroquel today and titrating off Lexapro.   Discussed interactions with both seroquel with wellbutrin and lexapro.   We will titrate off Lexapro       Follow-up if symptoms worsen or fail to improve.    Chantel Bowen MD  [facility]   Family Medicine

## 2018-11-03 ENCOUNTER — PATIENT MESSAGE (OUTPATIENT)
Dept: INTERNAL MEDICINE | Facility: CLINIC | Age: 23
End: 2018-11-03

## 2018-11-05 LAB — ESTROGEN SERPL-MCNC: 171 PG/ML

## 2018-11-10 ENCOUNTER — PATIENT MESSAGE (OUTPATIENT)
Dept: INTERNAL MEDICINE | Facility: CLINIC | Age: 23
End: 2018-11-10

## 2018-11-12 ENCOUNTER — PATIENT MESSAGE (OUTPATIENT)
Dept: INTERNAL MEDICINE | Facility: CLINIC | Age: 23
End: 2018-11-12

## 2018-12-28 ENCOUNTER — PATIENT MESSAGE (OUTPATIENT)
Dept: INTERNAL MEDICINE | Facility: CLINIC | Age: 23
End: 2018-12-28

## 2018-12-29 RX ORDER — BUPROPION HYDROCHLORIDE 200 MG/1
200 TABLET, EXTENDED RELEASE ORAL 2 TIMES DAILY
Qty: 180 TABLET | Refills: 0 | Status: SHIPPED | OUTPATIENT
Start: 2018-12-29 | End: 2019-03-13

## 2019-01-16 ENCOUNTER — PATIENT MESSAGE (OUTPATIENT)
Dept: INTERNAL MEDICINE | Facility: CLINIC | Age: 24
End: 2019-01-16

## 2019-01-17 ENCOUNTER — TELEPHONE (OUTPATIENT)
Dept: INTERNAL MEDICINE | Facility: CLINIC | Age: 24
End: 2019-01-17

## 2019-01-17 NOTE — TELEPHONE ENCOUNTER
Spoke with representative today from [employer]   The patient wants to be swabbed for assistance on treating her for depression.   Someone will call Mrs. Michelle Granados to complete the registration.   Address was given for the clinic today.

## 2019-02-11 ENCOUNTER — PATIENT MESSAGE (OUTPATIENT)
Dept: INTERNAL MEDICINE | Facility: CLINIC | Age: 24
End: 2019-02-11

## 2019-02-13 ENCOUNTER — PATIENT MESSAGE (OUTPATIENT)
Dept: INTERNAL MEDICINE | Facility: CLINIC | Age: 24
End: 2019-02-13

## 2019-02-21 ENCOUNTER — TELEPHONE (OUTPATIENT)
Dept: INTERNAL MEDICINE | Facility: CLINIC | Age: 24
End: 2019-02-21

## 2019-02-21 NOTE — TELEPHONE ENCOUNTER
----- Message from Hallie Finn sent at 2/21/2019 11:14 AM CST -----  Contact: pt  The pt request a return call, no additional info given, can be reached at [phone]///[email or web address]

## 2019-02-21 NOTE — TELEPHONE ENCOUNTER
Pt states she that [employer] contacted her and now Dr Bowen has to set up an account and they will send the rest of the testing to her. Sent Dr Bowen the email of the information on how to set up the account.  Advised pt that when the rest of the testing supplies comes in, the office will call her to schedule an appt. She agreed to plan.

## 2019-02-25 ENCOUNTER — PATIENT MESSAGE (OUTPATIENT)
Dept: INTERNAL MEDICINE | Facility: CLINIC | Age: 24
End: 2019-02-25

## 2019-02-26 ENCOUNTER — PATIENT MESSAGE (OUTPATIENT)
Dept: INTERNAL MEDICINE | Facility: CLINIC | Age: 24
End: 2019-02-26

## 2019-02-28 ENCOUNTER — OFFICE VISIT (OUTPATIENT)
Dept: INTERNAL MEDICINE | Facility: CLINIC | Age: 24
End: 2019-02-28
Payer: COMMERCIAL

## 2019-02-28 VITALS
HEART RATE: 100 BPM | HEIGHT: 62 IN | TEMPERATURE: 98 F | BODY MASS INDEX: 22.03 KG/M2 | DIASTOLIC BLOOD PRESSURE: 84 MMHG | WEIGHT: 119.69 LBS | RESPIRATION RATE: 20 BRPM | OXYGEN SATURATION: 99 % | SYSTOLIC BLOOD PRESSURE: 122 MMHG

## 2019-02-28 DIAGNOSIS — I10 ESSENTIAL HYPERTENSION: ICD-10-CM

## 2019-02-28 DIAGNOSIS — F32.A ANXIETY AND DEPRESSION: Primary | ICD-10-CM

## 2019-02-28 DIAGNOSIS — F41.9 ANXIETY AND DEPRESSION: Primary | ICD-10-CM

## 2019-02-28 PROCEDURE — 99214 PR OFFICE/OUTPT VISIT, EST, LEVL IV, 30-39 MIN: ICD-10-PCS | Mod: S$GLB,,, | Performed by: FAMILY MEDICINE

## 2019-02-28 PROCEDURE — 99214 OFFICE O/P EST MOD 30 MIN: CPT | Mod: S$GLB,,, | Performed by: FAMILY MEDICINE

## 2019-02-28 PROCEDURE — 99999 PR PBB SHADOW E&M-EST. PATIENT-LVL III: CPT | Mod: PBBFAC,,, | Performed by: FAMILY MEDICINE

## 2019-02-28 PROCEDURE — 99213 OFFICE O/P EST LOW 20 MIN: CPT | Mod: PBBFAC | Performed by: FAMILY MEDICINE

## 2019-02-28 PROCEDURE — 99999 PR PBB SHADOW E&M-EST. PATIENT-LVL III: ICD-10-PCS | Mod: PBBFAC,,, | Performed by: FAMILY MEDICINE

## 2019-02-28 RX ORDER — AMLODIPINE BESYLATE 2.5 MG/1
2.5 TABLET ORAL DAILY
Qty: 90 TABLET | Refills: 2 | Status: SHIPPED | OUTPATIENT
Start: 2019-02-28 | End: 2019-09-10 | Stop reason: ALTCHOICE

## 2019-02-28 NOTE — PROGRESS NOTES
Subjective:       Patient ID: Christopher Webber is a 23 y.o. female.    Chief Complaint: No chief complaint on file.    HPI Ms. Webber presents today for follow up anxiety/depression. She has had anxiety and depression for 5 years.       For problems sleeping she is on melatonin PRN.  -    For anxiety-she has tried buspar and seroquel for her anxiety, which she discontinued do the palpitations.  She has also tried cetirizine for her anxiety but did not tolerate sedation.     For her depression, she is currently on wellbutrin.  In the past she has tried lexapro for her depression, which she said had diminishing effects following 6 months of therapy.  She said this is a minor concern for her.  She has had poor sleep, anhedonia, reduced concentration, and appetite change >50% of the time every day in the past 2 weeks but no depressed mood, guilt/worthlessness, fatigue, psychomotor retardation, or suicidal thoughts.  She has had brief episodes of reduced need for sleep >1 day but attributes this to restlessness from anxiety.  She has not had delusions, irritability, illusions of grandeur, flight of ideas, agitation, or talkativeness.     Her HTN is controlled on amlodipine 2.5mg per day. She would like a refill    Review of Systems   Constitutional: Negative for activity change, appetite change, fatigue and fever.   HENT: Negative for congestion, ear pain and sinus pressure.    Eyes: Negative for pain and visual disturbance.   Respiratory: Negative for cough, chest tightness and wheezing.    Cardiovascular: Negative for chest pain, palpitations and leg swelling.   Gastrointestinal: Negative for abdominal distention, abdominal pain, constipation, diarrhea, nausea and vomiting.   Endocrine: Negative for polydipsia and polyuria.   Genitourinary: Negative for difficulty urinating, dyspareunia, dysuria, flank pain and hematuria.   Musculoskeletal: Negative for arthralgias and back pain.   Skin: Negative for rash.   Neurological:  Negative for dizziness, tremors, syncope, weakness, numbness and headaches.   Psychiatric/Behavioral: Positive for agitation, decreased concentration, dysphoric mood and sleep disturbance. Negative for confusion. The patient is nervous/anxious.          Objective:        Physical Exam   Constitutional: She appears well-developed and well-nourished.   HENT:   Head: Normocephalic and atraumatic.   Skin: Skin is warm.   Psychiatric: She has a normal mood and affect. Her behavior is normal.   Vitals reviewed.        Assessment/Plan:     Anxiety and depression  Obtained consent for [employer] DNA swab.   Completed order online   Reviewed financial responsibility with patient.   Labeled envelope as directed  Copied both insurance cards front and back and put in envelope   Swabbed both cheeks with separate swabs and placed them in labeled envelope.   Gave sealed envelope to patient to take to the [employer] at the end of the road so that it could be picked up today by [employer]. She was able to drop it off prior to  at 4:15 p    Addendum: patient notified from [employer] that the sample had been received at 93 a.m. 3/1/19    Essential hypertension-controlled  Refilled medicaiton  -     amLODIPine (NORVASC) 2.5 MG tablet; Take 1 tablet (2.5 mg total) by mouth once daily.  Dispense: 90 tablet; Refill: 2    Follow-up in about 2 months (around 4/28/2019).    Chantel Bowen MD  [facility]   Family Medicine

## 2019-02-28 NOTE — MEDICAL/APP STUDENT
Subjective:       Patient ID: Christopher Webber is a 23 y.o. female.    Chief Complaint: Anxiety followup    Christopher Webber 23F with HTN here for followup of anxiety and depression.  She has had anxiety and depression for 5 years.      For anxiety, she is on melatonin PRN.  She has tried buspar and seroquel for her anxiety, which she discontinued do the palpitations.  She has also tried cetirizine for her anxiety but did not tolerate sedation.    For her depression, she is currently on wellbutrin.  In the past she has tried lexapro for her depression, which she said had diminishing effects following 6 months of therapy.  She said this is a minor concern for her.  She has had poor sleep, anhedonia, reduced concentration, and appetite change >50% of the time every day in the past 2 weeks but no depressed mood, guilt/worthlessness, fatigue, psychomotor retardation, or suicidal thoughts.  She has had brief episodes of reduced need for sleep >1 day but attributes this to restlessness from anxiety.  She has not had delusions, irritability, illusions of grandeur, flight of ideas, agitation, or talkativeness.    Her HTN is controlled on amlodipine 2.5mg per day.           Review of Systems   Constitutional: Positive for activity change and appetite change (>50% of the time for past 2 week). Negative for chills, diaphoresis, fatigue, fever and unexpected weight change.   Respiratory: Negative for chest tightness and shortness of breath.    Cardiovascular: Negative for chest pain and palpitations.   Gastrointestinal: Negative for abdominal distention, constipation and diarrhea.   Genitourinary: Negative for difficulty urinating.   Musculoskeletal: Negative for arthralgias and back pain.   Psychiatric/Behavioral: Positive for decreased concentration (>50% of the time for past 2 week) and sleep disturbance (difficulty falling asleep due to worry). Negative for agitation, dysphoric mood and suicidal ideas. The patient is hyperactive.          Anhedonia: >50% of the time for past 2 week       Objective:       Vitals:    02/28/19 1458   BP: 122/84   Pulse: 100   Resp: 20   Temp: 97.9 °F (36.6 °C)       Physical Exam   Constitutional: She is oriented to person, place, and time. She appears well-developed and well-nourished.   HENT:   Head: Normocephalic and atraumatic.   Eyes: Conjunctivae are normal. No scleral icterus.   Neck: No JVD present.   Cardiovascular: Normal rate and regular rhythm.   Pulmonary/Chest: Effort normal and breath sounds normal.   Abdominal: Soft. Bowel sounds are normal. She exhibits no distension and no mass. There is no tenderness. There is no guarding.   Lymphadenopathy:     She has no cervical adenopathy.   Neurological: She is alert and oriented to person, place, and time.   Skin: Skin is warm and dry.   Psychiatric: She has a normal mood and affect. Her behavior is normal. Judgment and thought content normal. Her affect is not labile and not inappropriate. Her speech is not rapid and/or pressured. She is not slowed.       Assessment:       1.  Anxiety  2.  Depression  3.  HTN    Plan:       Anxiety   [employer] assay sent today   Melatonin PRN for sleep    Depression   Continue wellbutrin   [employer] as per above    HTN   Well controlled   Refill amlodipine    Dispo   RTC PRN

## 2019-03-01 ENCOUNTER — PATIENT MESSAGE (OUTPATIENT)
Dept: INTERNAL MEDICINE | Facility: CLINIC | Age: 24
End: 2019-03-01

## 2019-03-07 ENCOUNTER — PATIENT MESSAGE (OUTPATIENT)
Dept: INTERNAL MEDICINE | Facility: CLINIC | Age: 24
End: 2019-03-07

## 2019-03-13 ENCOUNTER — TELEPHONE (OUTPATIENT)
Dept: INTERNAL MEDICINE | Facility: CLINIC | Age: 24
End: 2019-03-13

## 2019-03-13 DIAGNOSIS — F32.A ANXIETY AND DEPRESSION: Primary | ICD-10-CM

## 2019-03-13 DIAGNOSIS — F41.9 ANXIETY AND DEPRESSION: Primary | ICD-10-CM

## 2019-03-13 RX ORDER — BUPROPION HYDROCHLORIDE 150 MG/1
300 TABLET ORAL DAILY
Qty: 180 TABLET | Refills: 1 | Status: SHIPPED | OUTPATIENT
Start: 2019-03-13 | End: 2019-09-10

## 2019-03-13 RX ORDER — ALPRAZOLAM 0.25 MG/1
0.25 TABLET ORAL 2 TIMES DAILY PRN
Qty: 20 TABLET | Refills: 0 | Status: SHIPPED | OUTPATIENT
Start: 2019-03-13 | End: 2019-04-03 | Stop reason: SDUPTHER

## 2019-04-03 DIAGNOSIS — F32.A ANXIETY AND DEPRESSION: ICD-10-CM

## 2019-04-03 DIAGNOSIS — F41.9 ANXIETY AND DEPRESSION: ICD-10-CM

## 2019-04-03 RX ORDER — ALPRAZOLAM 0.25 MG/1
0.25 TABLET ORAL 2 TIMES DAILY PRN
Qty: 20 TABLET | Refills: 0 | Status: SHIPPED | OUTPATIENT
Start: 2019-04-03 | End: 2019-06-13 | Stop reason: SDUPTHER

## 2019-04-21 ENCOUNTER — PATIENT MESSAGE (OUTPATIENT)
Dept: INTERNAL MEDICINE | Facility: CLINIC | Age: 24
End: 2019-04-21

## 2019-06-13 DIAGNOSIS — F32.A ANXIETY AND DEPRESSION: ICD-10-CM

## 2019-06-13 DIAGNOSIS — F41.9 ANXIETY AND DEPRESSION: ICD-10-CM

## 2019-06-14 RX ORDER — ALPRAZOLAM 0.25 MG/1
TABLET ORAL
Qty: 20 TABLET | Refills: 0 | Status: SHIPPED | OUTPATIENT
Start: 2019-06-14 | End: 2019-07-23 | Stop reason: SDUPTHER

## 2019-07-19 ENCOUNTER — PATIENT MESSAGE (OUTPATIENT)
Dept: INTERNAL MEDICINE | Facility: CLINIC | Age: 24
End: 2019-07-19

## 2019-07-19 DIAGNOSIS — Z87.19 HISTORY OF IBS: Primary | ICD-10-CM

## 2019-07-23 DIAGNOSIS — F32.A ANXIETY AND DEPRESSION: ICD-10-CM

## 2019-07-23 DIAGNOSIS — F41.9 ANXIETY AND DEPRESSION: ICD-10-CM

## 2019-07-24 RX ORDER — ALPRAZOLAM 0.25 MG/1
TABLET ORAL
Qty: 20 TABLET | Refills: 0 | Status: SHIPPED | OUTPATIENT
Start: 2019-07-24 | End: 2019-08-20 | Stop reason: SDUPTHER

## 2019-07-26 ENCOUNTER — OFFICE VISIT (OUTPATIENT)
Dept: INTERNAL MEDICINE | Facility: CLINIC | Age: 24
End: 2019-07-26
Payer: COMMERCIAL

## 2019-07-26 VITALS
WEIGHT: 114.88 LBS | OXYGEN SATURATION: 99 % | HEART RATE: 71 BPM | HEIGHT: 62 IN | DIASTOLIC BLOOD PRESSURE: 90 MMHG | SYSTOLIC BLOOD PRESSURE: 140 MMHG | BODY MASS INDEX: 21.14 KG/M2 | TEMPERATURE: 96 F

## 2019-07-26 DIAGNOSIS — Z13.220 SCREENING CHOLESTEROL LEVEL: ICD-10-CM

## 2019-07-26 DIAGNOSIS — G44.209 MUSCLE TENSION HEADACHE: Primary | ICD-10-CM

## 2019-07-26 DIAGNOSIS — M62.89 MUSCLE TIGHTNESS: ICD-10-CM

## 2019-07-26 DIAGNOSIS — Z00.00 ROUTINE PHYSICAL EXAMINATION: ICD-10-CM

## 2019-07-26 PROCEDURE — 99999 PR PBB SHADOW E&M-EST. PATIENT-LVL III: ICD-10-PCS | Mod: PBBFAC,,, | Performed by: FAMILY MEDICINE

## 2019-07-26 PROCEDURE — 99999 PR PBB SHADOW E&M-EST. PATIENT-LVL III: CPT | Mod: PBBFAC,,, | Performed by: FAMILY MEDICINE

## 2019-07-26 PROCEDURE — 99213 PR OFFICE/OUTPT VISIT, EST, LEVL III, 20-29 MIN: ICD-10-PCS | Mod: S$GLB,,, | Performed by: FAMILY MEDICINE

## 2019-07-26 PROCEDURE — 3008F BODY MASS INDEX DOCD: CPT | Mod: CPTII,S$GLB,, | Performed by: FAMILY MEDICINE

## 2019-07-26 PROCEDURE — 99213 OFFICE O/P EST LOW 20 MIN: CPT | Mod: S$GLB,,, | Performed by: FAMILY MEDICINE

## 2019-07-26 PROCEDURE — 3008F PR BODY MASS INDEX (BMI) DOCUMENTED: ICD-10-PCS | Mod: CPTII,S$GLB,, | Performed by: FAMILY MEDICINE

## 2019-07-26 RX ORDER — METHOCARBAMOL 500 MG/1
500 TABLET, FILM COATED ORAL 4 TIMES DAILY
Qty: 40 TABLET | Refills: 0 | Status: SHIPPED | OUTPATIENT
Start: 2019-07-26 | End: 2019-08-05

## 2019-07-26 NOTE — PROGRESS NOTES
Subjective:       Patient ID: Christopher Webber is a 23 y.o. female.    Chief Complaint: Irritable Bowel Syndrome    HPI Ms. Webber presents today for IBS and abdominal pain.   Flares   She reports she has been fine for a couple years.     Eating certain foods and alcohol effects her. Notes if she has a glass of wine she has had symptoms. Aware of stress  Muscle pain/tension when she has a flare.     Burping or BM helps her symptoms.     Headache, back ache and shoulder pain -no stomach pain but was told this was IBS in the past. IBS diagnosed by endoscopy. She never had stomach cramps or pain.   Linzess made her go to the bathroom all day.   Magnesium has helped.   She has been doing this but still has had flares.     Xanax she takes as needed feels she is getting tolerant of the 0.25mg  Finds that she takes it every other day to two days    Review of Systems   Respiratory: Negative.    Cardiovascular: Negative.    Gastrointestinal: Negative.    Genitourinary: Negative.    Musculoskeletal: Positive for myalgias.         Objective:        Physical Exam   Constitutional: She is oriented to person, place, and time. She appears well-developed and well-nourished.   HENT:   Head: Normocephalic and atraumatic.   Neurological: She is alert and oriented to person, place, and time.   Skin: Skin is warm.   Vitals reviewed.      Assessment/Plan:     Muscle tension headache  -     methocarbamol (ROBAXIN) 500 MG Tab; Take 1 tablet (500 mg total) by mouth 4 (four) times daily. for 10 days  Dispense: 40 tablet; Refill: 0    Muscle tightness  -     methocarbamol (ROBAXIN) 500 MG Tab; Take 1 tablet (500 mg total) by mouth 4 (four) times daily. for 10 days  Dispense: 40 tablet; Refill: 0    Screening cholesterol level  -     Lipid panel; Future; Expected date: 07/26/2019    Routine physical examination  -     Comprehensive metabolic panel; Future; Expected date: 07/26/2019  -     CBC auto differential; Future; Expected date:  07/26/2019        Schedule annual exam  Follow up if symptoms worsen or fail to improve.    Chantel Bowen MD  [facility]   Family Medicine

## 2019-08-01 ENCOUNTER — PATIENT MESSAGE (OUTPATIENT)
Dept: INTERNAL MEDICINE | Facility: CLINIC | Age: 24
End: 2019-08-01

## 2019-08-01 RX ORDER — TIZANIDINE 4 MG/1
4 TABLET ORAL EVERY 6 HOURS PRN
Qty: 30 TABLET | Refills: 0 | Status: SHIPPED | OUTPATIENT
Start: 2019-08-01 | End: 2019-08-11

## 2019-08-20 DIAGNOSIS — F32.A ANXIETY AND DEPRESSION: ICD-10-CM

## 2019-08-20 DIAGNOSIS — F41.9 ANXIETY AND DEPRESSION: ICD-10-CM

## 2019-08-20 RX ORDER — ALPRAZOLAM 0.25 MG/1
TABLET ORAL
Qty: 20 TABLET | Refills: 0 | Status: SHIPPED | OUTPATIENT
Start: 2019-08-20 | End: 2019-10-10 | Stop reason: SDUPTHER

## 2019-09-05 ENCOUNTER — PATIENT MESSAGE (OUTPATIENT)
Dept: INTERNAL MEDICINE | Facility: CLINIC | Age: 24
End: 2019-09-05

## 2019-09-10 ENCOUNTER — PATIENT MESSAGE (OUTPATIENT)
Dept: ADMINISTRATIVE | Facility: OTHER | Age: 24
End: 2019-09-10

## 2019-09-10 ENCOUNTER — OFFICE VISIT (OUTPATIENT)
Dept: INTERNAL MEDICINE | Facility: CLINIC | Age: 24
End: 2019-09-10
Payer: COMMERCIAL

## 2019-09-10 ENCOUNTER — NURSE TRIAGE (OUTPATIENT)
Dept: ADMINISTRATIVE | Facility: CLINIC | Age: 24
End: 2019-09-10

## 2019-09-10 VITALS
OXYGEN SATURATION: 97 % | DIASTOLIC BLOOD PRESSURE: 80 MMHG | HEART RATE: 92 BPM | SYSTOLIC BLOOD PRESSURE: 130 MMHG | TEMPERATURE: 98 F | BODY MASS INDEX: 21.1 KG/M2 | WEIGHT: 114.63 LBS | HEIGHT: 62 IN

## 2019-09-10 DIAGNOSIS — J30.9 ALLERGIC RHINITIS, UNSPECIFIED SEASONALITY, UNSPECIFIED TRIGGER: Primary | ICD-10-CM

## 2019-09-10 DIAGNOSIS — Z23 NEEDS FLU SHOT: ICD-10-CM

## 2019-09-10 DIAGNOSIS — I10 ESSENTIAL HYPERTENSION: ICD-10-CM

## 2019-09-10 DIAGNOSIS — Z3A.01 LESS THAN 8 WEEKS GESTATION OF PREGNANCY: ICD-10-CM

## 2019-09-10 DIAGNOSIS — K58.1 IRRITABLE BOWEL SYNDROME WITH CONSTIPATION: Chronic | ICD-10-CM

## 2019-09-10 DIAGNOSIS — F41.9 ANXIETY: ICD-10-CM

## 2019-09-10 PROBLEM — Z34.01 ENCOUNTER FOR SUPERVISION OF NORMAL FIRST PREGNANCY IN FIRST TRIMESTER: Status: RESOLVED | Noted: 2017-10-27 | Resolved: 2019-09-10

## 2019-09-10 PROCEDURE — 99999 PR PBB SHADOW E&M-EST. PATIENT-LVL III: CPT | Mod: PBBFAC,,, | Performed by: NURSE PRACTITIONER

## 2019-09-10 PROCEDURE — 99214 PR OFFICE/OUTPT VISIT, EST, LEVL IV, 30-39 MIN: ICD-10-PCS | Mod: S$GLB,,, | Performed by: NURSE PRACTITIONER

## 2019-09-10 PROCEDURE — 99214 OFFICE O/P EST MOD 30 MIN: CPT | Mod: S$GLB,,, | Performed by: NURSE PRACTITIONER

## 2019-09-10 PROCEDURE — 99999 PR PBB SHADOW E&M-EST. PATIENT-LVL III: ICD-10-PCS | Mod: PBBFAC,,, | Performed by: NURSE PRACTITIONER

## 2019-09-10 PROCEDURE — 3008F BODY MASS INDEX DOCD: CPT | Mod: CPTII,S$GLB,, | Performed by: NURSE PRACTITIONER

## 2019-09-10 PROCEDURE — 3008F PR BODY MASS INDEX (BMI) DOCUMENTED: ICD-10-PCS | Mod: CPTII,S$GLB,, | Performed by: NURSE PRACTITIONER

## 2019-09-10 RX ORDER — MAGNESIUM 200 MG
1 TABLET ORAL 2 TIMES DAILY PRN
COMMUNITY
End: 2020-06-17

## 2019-09-10 RX ORDER — NIFEDIPINE 30 MG/1
30 TABLET, EXTENDED RELEASE ORAL DAILY
Qty: 30 TABLET | Refills: 11 | Status: SHIPPED | OUTPATIENT
Start: 2019-09-10 | End: 2020-05-11

## 2019-09-10 NOTE — PROGRESS NOTES
Christopher Webber  09/10/2019  [id number]    Chantel Bowen MD  Patient Care Team:  Chantel Bowen MD as PCP - General (Internal Medicine)  Rose Shaw LPN as Care Coordinator (Internal Medicine)      Visit Type:an urgent visit for a new problem    Chief Complaint:  Chief Complaint   Patient presents with    Sinus Problem    Cough    Headache       History of Present Illness:  Pt presents c/o sinus congestion x 2 weeks.  Took two doses of Abx rx by [facility] on day two of symptoms, but stopped as she didn't think symptoms were infectious or that meds helped.  Denies fever chills presently, but did have fever on first day of congestion. She reports PND, worse at night, rhinorhea, mild sinus tenderness, some improvement with suddafed, but DC'd due to elavating her BP.  Currently on norvasc 2.5mg since the birth of her twins this past April when she developed preeclampsia after delivery.  Prior to delivery she was on procardia.     LMP , with + UPT two days ago. Has not made apt to see OB yet (Dr. Lan at [facility]).        History:  Past Medical History:   Diagnosis Date    Narcolepsy      Past Surgical History:   Procedure Laterality Date    tonsilectomy       Family History   Problem Relation Age of Onset    Breast cancer Neg Hx     Cancer Neg Hx     Colon cancer Neg Hx     Diabetes Neg Hx     Eclampsia Neg Hx     Hypertension Neg Hx     Miscarriages / Stillbirths Neg Hx      labor Neg Hx     Ovarian cancer Neg Hx     Stroke Neg Hx      Social History     Socioeconomic History    Marital status: Single     Spouse name: Not on file    Number of children: Not on file    Years of education: Not on file    Highest education level: Not on file   Occupational History    Not on file   Social Needs    Financial resource strain: Not on file    Food insecurity:     Worry: Not on file     Inability: Not on file    Transportation needs:     Medical: Not on file      Non-medical: Not on file   Tobacco Use    Smoking status: Never Smoker    Smokeless tobacco: Never Used   Substance and Sexual Activity    Alcohol use: Yes     Alcohol/week: 0.0 oz     Comment: social    Drug use: No    Sexual activity: Yes     Partners: Male     Birth control/protection: None, Condom   Lifestyle    Physical activity:     Days per week: Not on file     Minutes per session: Not on file    Stress: Not on file   Relationships    Social connections:     Talks on phone: Not on file     Gets together: Not on file     Attends [employer] service: Not on file     Active member of club or organization: Not on file     Attends meetings of clubs or organizations: Not on file     Relationship status: Not on file   Other Topics Concern    Not on file   Social History Narrative    Not on file     Patient Active Problem List   Diagnosis    BV (bacterial vaginosis)    Candidiasis of vagina    Primary narcolepsy without cataplexy    Idiopathic 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    Medications:  Current Outpatient Medications on File Prior to Visit   Medication Sig Dispense Refill    ALPRAZolam (XANAX) 0.25 MG tablet TAKE 1 TABLET BY MOUTH TWICE DAILY AS NEEDED FOR ANXIETY 20 tablet 0    amLODIPine (NORVASC) 2.5 MG tablet Take 1 tablet (2.5 mg total) by mouth once daily. 90 tablet 2    [DISCONTINUED] buPROPion (WELLBUTRIN XL) 150 MG TB24 tablet Take 2 tablets (300 mg total) by mouth once daily. 180 tablet 1    [DISCONTINUED] busPIRone (BUSPAR) 5 MG Tab Take 1 tablet (5 mg total) by mouth 2 (two) times daily as needed (anxiety). 60 tablet 1    [DISCONTINUED]  escitalopram oxalate (LEXAPRO) 20 MG tablet Take 1 tablet (20 mg total) by mouth once daily. 90 tablet 1    [DISCONTINUED] QUEtiapine (SEROQUEL) 25 MG Tab Take 1 tablet (25 mg total) by mouth every evening. 90 tablet 1     No current facility-administered medications on file prior to visit.        Medications have been reviewed and reconciled with patient at this visit.  Barriers to medications present (no)  Adverse reactions to current medications (no)  Over the counter medications reviewed (Yes ), and if needed added to active Medication list at this visit.     Exam:  Wt Readings from Last 3 Encounters:   09/10/19 52 kg (114 lb 10.2 oz)   07/26/19 52.1 kg (114 lb 13.8 oz)   02/28/19 54.3 kg (119 lb 11.4 oz)     Temp Readings from Last 3 Encounters:   09/10/19 98.2 °F (36.8 °C) (Tympanic)   07/26/19 96.1 °F (35.6 °C) (Tympanic)   02/28/19 97.9 °F (36.6 °C) (Tympanic)     BP Readings from Last 3 Encounters:   09/10/19 130/80   07/26/19 (!) 140/90   02/28/19 122/84     Pulse Readings from Last 3 Encounters:   09/10/19 92   07/26/19 71   02/28/19 100     Body mass index is 20.97 kg/m².      Review of Systems   Constitutional: Positive for fever (x 1 day 2 weeks ago, none since). Negative for chills, diaphoresis and malaise/fatigue.   HENT: Positive for congestion and sinus pain. Negative for ear discharge, ear pain, hearing loss, nosebleeds and sore throat.    Eyes: Negative for blurred vision, double vision, pain, discharge and redness.   Respiratory: Negative for cough, hemoptysis, shortness of breath, wheezing and stridor.    Cardiovascular: Negative for chest pain, palpitations and leg swelling.   Gastrointestinal: Positive for constipation. Negative for abdominal pain, diarrhea, nausea and vomiting.   Genitourinary: Negative for dysuria.   Musculoskeletal: Negative for myalgias.   Skin: Negative for itching and rash.   Neurological: Negative for dizziness, loss of consciousness and headaches.  "  Endo/Heme/Allergies: Negative for environmental allergies.   Psychiatric/Behavioral: Negative for depression and substance abuse. The patient is nervous/anxious.      Physical Exam   Constitutional: She is oriented to person, place, and time. She appears well-developed and well-nourished. No distress.   HENT:   Head: Normocephalic and atraumatic.   Right Ear: External ear normal.   Left Ear: External ear normal.   Mouth/Throat: No oropharyngeal exudate.   PND, nasal turbinates boggy   Eyes: Pupils are equal, round, and reactive to light. Conjunctivae and EOM are normal. Right eye exhibits no discharge. Left eye exhibits no discharge. No scleral icterus.   Neck: Normal range of motion. No tracheal deviation present.   Cardiovascular: Normal rate and regular rhythm.   Pulmonary/Chest: Effort normal and breath sounds normal. No respiratory distress.   Abdominal: Soft. Bowel sounds are normal. She exhibits no distension. There is no tenderness.   Musculoskeletal: Normal range of motion. She exhibits no edema or deformity.   Neurological: She is alert and oriented to person, place, and time.   Skin: Skin is warm and dry. No rash noted. She is not diaphoretic. No erythema. No pallor.   Psychiatric: She has a normal mood and affect. Her behavior is normal. Judgment and thought content normal.   Nursing note and vitals reviewed.      Laboratory Reviewed ({Yes)  Lab Results   Component Value Date    WBC 5.99 11/02/2018    HGB 12.2 11/02/2018    HCT 36.3 (L) 11/02/2018     (H) 11/02/2018    ALT 12 11/02/2018    AST 22 11/02/2018     11/02/2018    K 3.7 11/02/2018     11/02/2018    CREATININE 0.8 11/02/2018    BUN 6 11/02/2018    CO2 22 (L) 11/02/2018    TSH 1.077 11/02/2018    HGBA1C 5.0 11/02/2018           Allergic rhinitis, unspecified seasonality, unspecified trigger  - will try clartin/zyrtec   - avoid products labels "D" due to pregnancy and HTN   - may try flonase/nasonex, avoid afrin "     Anxiety  - DC'd xanax due to pregnancy   - dicussed therapy, exercise regime and self care     Essential hypertension  -     NIFEdipine (PROCARDIA-XL) 30 MG (OSM) 24 hr tablet; Take 1 tablet (30 mg total) by mouth once daily.  Dispense: 30 tablet; Refill: 11  -     Hypertension Digital Medicine (HDMP) Enrollment Order  -     Hypertension Digital Medicine (HDMP): Assign Onboarding Questionnaires  - stopped norvasc     Needs flu shot  - will get from pharmacy     Irritable bowel syndrome with constipation  - reports controlled with magnesium     Pregnancy   - educated to see OB by 8 weeks gestation   - stopped norvasc and xanax     Follow up: No follow-ups on file.

## 2019-09-10 NOTE — PATIENT INSTRUCTIONS
"  Pregnancy: Your First Trimester Changes  The first trimester is a time of rapid development for your baby. Because your baby is growing so quickly, it is important that you start a healthy lifestyle right away. By the end of the first trimester, your baby has formed all of its major body organs and weighs just over an ounce.     Actual size of baby is 1/4"    Month 1 (Weeks 1 to 4)  The placenta (the organ that nourishes your baby) begins to form. The brain, spinal cord, heart, gastrointestinal tract, and lungs begin to develop. Your baby is about 1/4 inch long by the end of the first month.     Actual size of baby is 1"    Month 2 (Weeks 5 to 8)  All of your babys major body organs form. The face, fingers, toes, ears, and eyes appear. By the end of the month, your baby is about 1-inch long.     Actual size of baby is 4"    Month 3 (Weeks 9 to 12)  Your baby can open and close its fists and mouth. The sexual organs begin to form. As the first trimester ends, your baby is about 3-inches long.  Date Last Reviewed: 8/16/2015  © 9481-2685 [employer]. [address] [place] [address]. All rights reserved. This information is not intended as a substitute for professional medical care. Always follow your healthcare professional's instructions.        Understanding Nasal Allergies  Nasal allergies (also called allergic rhinitis) are a common health problem. They may be seasonal. This means they cause symptoms only at certain times of the year. Or they may be perennial. This means they cause symptoms all year long. Other health problems, such as asthma, often occur along with allergies as well.    What is an allergic reaction?  An allergy is a reaction to a substance called an allergen. Common allergens include:  · Wind-borne pollen  · Mold  · Dust mites  · Furry and feathered animals  · Cockroaches  Normally, allergens are harmless. But when a person has allergies, the body thinks they are " harmful. The body then attacks allergens with antibodies. Antibodies are attached to special cells called mast cells. Allergens stick to the antibodies. This makes the mast cells release histamine and other chemicals. This is an allergic reaction. The chemicals irritate nearby nasal tissue. This causes nasal allergy symptoms.  Common nasal allergy symptoms  Allergies can cause nasal tissue to swell. This makes the air passages smaller. The nose may feel stuffed up. The nose may also make extra mucus, which can plug the nasal passages or drip out of the nose. Mucus can drip down the back of the throat (postnasal drip) as well. Sinus tissue can swell. This may cause pain and headache. Common allergy symptoms include:  · Runny nose with clear, watery discharge  · Stuffy nose (nasal congestion)  · Drainage down your throat (postnasal drip)  · Sneezing  · Red, watery eyes  · Itchy nose, eyes, ears, and throat  · Plugged-up ears (ear congestion)  · Sore throat  · Coughing  · Sinus pain and swelling  · Headache  It may not be allergies  Other health problems can cause symptoms like those of nasal allergies. These include:  · Nonallergic rhinitis and viruses such as colds  · Irritants and pollutants, such as strong odors or smoke  · Certain medicines  · Changes in the weather   Treatment  Your healthcare provider will evaluate you to find the cause of your symptoms then recommend treatment. If your symptoms are due to nasal allergies, your healthcare provider may prescribe nasal steroid sprays or oral antihistamines to help reduce symptoms. Avoidance of the allergen will also be suggested. You may also be referred to an allergist.   Date Last Reviewed: 10/1/2016  © 3479-8247 [employer]. [address] [place] [address], [place] [address]. All rights reserved. This information is not intended as a substitute for professional medical care. Always follow your healthcare professional's instructions.        Established High  Blood Pressure    High blood pressure (hypertension) is a chronic disease. Often, healthcare providers dont know what causes it. But it can be caused by certain health conditions and medicines.  If you have high blood pressure, you may not have any symptoms. If you do have symptoms, they may include headache, dizziness, changes in your vision, chest pain, and shortness of breath. But even without symptoms, high blood pressure thats not treated raises your risk for heart attack and stroke. High blood pressure is a serious health risk and shouldnt be ignored.  A blood pressure reading is made up of two numbers: a higher number over a lower number. The top number is the systolic pressure. The bottom number is the diastolic pressure. A normal blood pressure is a systolic pressure of  less than 120 over a diastolic pressure of less than 80. You will see your blood pressure readings written together. For example, a person with a systolic pressure of 188 and a diastolic pressure of 78 will have 118/78 written in the medical record.  High blood pressure is when either the top number is 140 or higher, or the bottom number is 90 or higher. This must be the result when taking your blood pressure a number of times. The blood pressures between normal and high are called prehypertension.  Home care  If you have high blood pressure, you should do what is listed below to lower your blood pressure. If you are taking medicines for high blood pressure, these methods may reduce or end your need for medicines in the future.  · Begin a weight-loss program if you are overweight.  · Cut back on how much salt you get in your diet. Heres how to do this:  ¨ Dont eat foods that have a lot of salt. These include olives, pickles, smoked meats, and salted potato chips.  ¨ Dont add salt to your food at the table.  ¨ Use only small amounts of salt when cooking.  · Start an exercise program. Talk with your healthcare provider about the type of  exercise program that would be best for you. It doesn't have to be hard. Even brisk walking for 20 minutes 3 times a week is a good form of exercise.  · Dont take medicines that stimulate the heart. This includes many over-the-counter cold and sinus decongestant pills and sprays, as well as diet pills. Check the warnings about hypertension on the label. Before buying any over-the-counter medicines or supplements, always ask the pharmacist about the product's potential interaction with your high blood pressure and your high blood pressure medicines.  · Stimulants such as amphetamine or cocaine could be deadly for someone with high blood pressure. Never take these.  · Limit how much caffeine you get in your diet. Switch to caffeine-free products.  · Stop smoking. If you are a long-time smoker, this can be hard. Talk to your healthcare provider about medicines and nicotine replacement options to help you. Also, enroll in a stop-smoking program to make it more likely that you will quit for good.  · Learn how to handle stress. This is an important part of any program to lower blood pressure. Learn about relaxation methods like meditation, yoga, or biofeedback.  · If your provider prescribed medicines, take them exactly as directed. Missing doses may cause your blood pressure get out of control.  · If you miss a dose or doses, check with your healthcare provider or pharmacist about what to do.  · Consider buying an automatic blood pressure machine. Ask your provider for a recommendation. You can get one of these at most pharmacies.     The American Heart Association recommends the following guidelines for home blood pressure monitoring:  · Don't smoke or drink coffee for 30 minutes before taking your blood pressure.  · Go to the bathroom before the test.  · Relax for 5 minutes before taking the measurement.  · Sit with your back supported (don't sit on a couch or soft chair); keep your feet on the floor uncrossed. Place  your arm on a solid flat surface (like a table) with the upper part of the arm at heart level. Place the middle of the cuff directly above the eye of the elbow. Check the monitor's instruction manual for an illustration.  · Take multiple readings. When you measure, take 2 to 3 readings one minute apart and record all of the results.  · Take your blood pressure at the same time every day, or as your healthcare provider recommends.  · Record the date, time, and blood pressure reading.  · Take the record with you to your next medical appointment. If your blood pressure monitor has a built-in memory, simply take the monitor with you to your next appointment.  · Call your provider if you have several high readings. Don't be frightened by a single high blood pressure reading, but if you get several high readings, check in with your healthcare provider.  · Note: When blood pressure reaches a systolic (top number) of 180 or higher OR diastolic (bottom number) of 110 or higher, seek emergency medical treatment.  Follow-up care  You will need to see your healthcare provider regularly. This is to check your blood pressure and to make changes to your medicines. Make a follow-up appointment as directed. Bring the record of your home blood pressure readings to the appointment.  When to seek medical advice  Call your healthcare provider right away if any of these occur:  · Blood pressure reaches a systolic (upper number) of 180 or higher OR a diastolic (bottom number) of 110 or higher  · Chest pain or shortness of breath  · Severe headache  · Throbbing or rushing sound in the ears  · Nosebleed  · Sudden severe pain in your belly (abdomen)  · Extreme drowsiness, confusion, or fainting  · Dizziness or spinning sensation (vertigo)  · Weakness of an arm or leg or one side of the face  · You have problems speaking or seeing   Date Last Reviewed: 12/1/2016  © 6342-5597 [employer]. [address] [place] [address], [place]  [address]. All rights reserved. This information is not intended as a substitute for professional medical care. Always follow your healthcare professional's instructions.

## 2019-09-10 NOTE — TELEPHONE ENCOUNTER
Reason for Disposition   Sinus congestion (pressure, fullness) present > 10 days    Additional Information   Negative: Sounds like a life-threatening emergency to the triager   Negative: Difficulty breathing, and not from stuffy nose (e.g., not relieved by cleaning out the nose)   Negative: SEVERE headache and has fever     Pain 5/10, no fever   Negative: Patient sounds very sick or weak to the triager   Negative: SEVERE sinus pain   Negative: Severe headache   Negative: Redness or swelling on the cheek, forehead, or around the eye   Negative: Fever > 103 F (39.4 C)   Negative: Fever > 100.0 F (37.8 C) and has diabetes mellitus or a weak immune system (e.g., HIV positive, cancer chemotherapy, organ transplant, splenectomy, chronic steroids)   Negative: Fever > 101 F (38.3 C) and over 60 years of age   Negative: Fever > 100.0 F (37.8 C) and bedridden (e.g., nursing home patient, stroke, chronic illness, recovering from surgery)   Negative: Fever present > 3 days (72 hours)   Negative: Fever returns after gone for over 24 hours and symptoms worse or not improved   Negative: Sinus pain (not just congestion) and fever   Negative: Earache    Protocols used: SINUS PAIN AND CONGESTION-A-OH    Christopher reports sinus pain, moderate (5-10 pain) headache, and congestion x 2 weeks.  No fever, but does have stuffy nose.  She was taking sinus medication a week ago, and was told it can raise BP so she discontinued.  BP today is 139/92 and she does have sinus HA.  Per [facility] triage protocol, recommend she be seen today.  Appointment made with JO Mujica at [facility] location.  Message to Chantel Bowen MD, pcp.  Please contact caller directly with any additional care advice.

## 2019-09-12 ENCOUNTER — PATIENT OUTREACH (OUTPATIENT)
Dept: OTHER | Facility: OTHER | Age: 24
End: 2019-09-12

## 2019-09-12 NOTE — LETTER
November 22, 2019     Christopher Webber  [address]  [place] [address]       Dear Christopher,    Welcome to [facility]! Our goal is to make care effective, proactive and convenient by using data you send us from home to better treat your chronic conditions.              My name is Keshawn Hayes, and I am your dedicated Digital Medicine clinician. As an expert in medication management, I will help ensure that the medications you are taking continue to provide the intended benefits and help you reach your goals. You can reach me directly at [phone] or by sending me a message directly through your [facility] account.      I am Gertrudis Baeza and I will be your health . My job is to help you identify lifestyle changes to improve your disease control. We will talk about nutrition, exercise, and other ways you may be able to adjust your current habits to better your health. Additionally, we will help ensure you are completing the tests and screenings that are necessary to help manage your conditions. You can reach me directly at  or by sending me a message directly through your [facility] account.    Most importantly, YOU are at the center of this team. Together, we will work to improve your overall health and encourage you to meet your goals for a healthier lifestyle.     What we expect from YOU:  · Please take frequent home blood pressure measurements. We ask that you take at least 1 blood pressure reading per week, but more information will better help us get you know you. Be sure you rest for a few minutes before taking the reading in a quiet, comfortable place.     Be available to receive phone calls or [facility] messages, when appropriate, from your care team. Please let us know if there are any specific days or times that work best for us to reach you via phone.     Complete routine tests and screenings. Dont worry, we will help keep you on track!           What you should  expect from your Digital Medicine Care Team:   We will work with you to create a personalized plan of care and provide you with encouragement and education, including regarding lifestyle changes, that could help you manage your disease states.     We will adjust your current medications, if needed, and continue to monitor your long-term progress.     We will provide you and your physician with monthly progress reports after you have been in the program for more than 30 days.     We will send you reminders through [facility] and text messages to help ensure you do not miss any testing deadlines to help manage your disease states.    You will be able to reach us by phone or through your [facility] account by clicking our names under Care Team on the right side of the home screen.    I look forward to working with you to achieve your blood pressure goals!    We look forward to working with you to help manage your health,    Sincerely,    Your Digital Medicine Team    Please visit our websites to learn more:   · Hypertension: [email or web address]      Remember, we are not available for emergencies. If you have an emergency, please contact your doctors office directly or call [facility] on-call ([phone] or [phone]) or [phone].

## 2019-09-12 NOTE — LETTER
November 22, 2019     Christopher Webber  [address]  [place] [address]       Dear Christopher,    Welcome to [facility]! Our goal is to make care effective, proactive and convenient by using data you send us from home to better treat your chronic conditions.              My name is Keshawn Hayes, and I am your dedicated Digital Medicine clinician. As an expert in medication management, I will help ensure that the medications you are taking continue to provide the intended benefits and help you reach your goals. You can reach me directly at [phone] or by sending me a message directly through your [facility] account.      I am Gertrudis Baeza and I will be your health . My job is to help you identify lifestyle changes to improve your disease control. We will talk about nutrition, exercise, and other ways you may be able to adjust your current habits to better your health. Additionally, we will help ensure you are completing the tests and screenings that are necessary to help manage your conditions. You can reach me directly at  or by sending me a message directly through your [facility] account.    Most importantly, YOU are at the center of this team. Together, we will work to improve your overall health and encourage you to meet your goals for a healthier lifestyle.     What we expect from YOU:  · Please take frequent home blood pressure measurements. We ask that you take at least 1 blood pressure reading per week, but more information will better help us get you know you. Be sure you rest for a few minutes before taking the reading in a quiet, comfortable place.     Be available to receive phone calls or [facility] messages, when appropriate, from your care team. Please let us know if there are any specific days or times that work best for us to reach you via phone.     Complete routine tests and screenings. Dont worry, we will help keep you on track!           What you should  expect from your Digital Medicine Care Team:   We will work with you to create a personalized plan of care and provide you with encouragement and education, including regarding lifestyle changes, that could help you manage your disease states.     We will adjust your current medications, if needed, and continue to monitor your long-term progress.     We will provide you and your physician with monthly progress reports after you have been in the program for more than 30 days.     We will send you reminders through [facility] and text messages to help ensure you do not miss any testing deadlines to help manage your disease states.    You will be able to reach us by phone or through your [facility] account by clicking our names under Care Team on the right side of the home screen.    I look forward to working with you to achieve your blood pressure goals!    We look forward to working with you to help manage your health,    Sincerely,    Your Digital Medicine Team    Please visit our websites to learn more:   · Hypertension: [email or web address]      Remember, we are not available for emergencies. If you have an emergency, please contact your doctors office directly or call [facility] on-call ([phone] or [phone]) or [phone].

## 2019-09-30 NOTE — PROGRESS NOTES
Digital Medicine Program Enrollment  HPI     In an attempt at enrollment pt was in class and requested a callback later this afternoon after 2:30

## 2019-10-10 DIAGNOSIS — F32.A ANXIETY AND DEPRESSION: ICD-10-CM

## 2019-10-10 DIAGNOSIS — F41.9 ANXIETY AND DEPRESSION: ICD-10-CM

## 2019-10-10 RX ORDER — ALPRAZOLAM 0.25 MG/1
TABLET ORAL
Qty: 20 TABLET | Refills: 0 | Status: SHIPPED | OUTPATIENT
Start: 2019-10-10 | End: 2020-01-02

## 2019-10-24 NOTE — PROGRESS NOTES
Digital Medicine Program Enrollment      Our goal is to get BP to consistently below 130/80mmHg and make the process convenient so patient can avoid extra trips to the office. Getting your blood pressure below 130/80mmHg (definition of control) will reduce your risk for heart attack, kidney failure, stroke and death (as well as kidney failure, eye disease, & dementia)      Reviewed that the Digital Medicine care team - consisting of a clinician and a health  - will follow the most current evidence-based national guidelines for treating your condition.  The health  will focus on lifestyle modifications and motivation while the clinician will focus on medication therapy.  The care team will review all data on a regular basis and reach out as needed.      Explained that one of the key parts of the program is communication with the care team.  Asked patient to respond to outreach attempts and complete questionnaires.  Stressed importance of medication adherence.      Explained that we expect patient to obtain several blood pressures per week at random times of day.  Instructed patient not to allow anyone else to use phone and monitoring device.  Confirmed appropriate BP monitoring technique.      Explained to patient that the digital medicine team is not available for emergencies.  Patient will call [facility] on-[facility] ([phone] or [phone]) or [phone] if needed.

## 2019-11-08 ENCOUNTER — PATIENT OUTREACH (OUTPATIENT)
Dept: OTHER | Facility: OTHER | Age: 24
End: 2019-11-08

## 2019-11-08 NOTE — PROGRESS NOTES
Digital Medicine: Health  Introduction    Introduced Christopher Webber to Digital Medicine. Discussed health  role and recommended lifestyle modifications.    The history is provided by the patient.     HYPERTENSION  Our goal is to get BP to consistently below 130/80mmHg and make the process convenient so patient can avoid extra trips to the office. Getting your blood pressure below 130/80mmHg (definition of control) will reduce your risk for heart attack, kidney failure, stroke and death (as well as kidney failure, eye disease, & dementia)      Reviewed that the Digital Medicine care team - consisting of a clinician and a health  - will follow the most current evidence-based national guidelines for treating your condition.  The health  will focus on lifestyle modifications and motivation while the clinician will focus on medication therapy.  The care team will review all data on a regular basis and reach out as needed.      Explained that one of the key parts of the program is communication with the care team.  Asked patient to respond to outreach attempts and complete questionnaires.  Stressed importance of medication adherence.    Reviewed non-pharmacologic therapies and impact on BP.      Explained that we expect patient to obtain several blood pressures per week at random times of day.  Instructed patient not to allow anyone else to use phone and monitoring device.  Confirmed appropriate BP monitoring technique.      Explained to patient that the digital medicine team is not available for emergencies.  Patient will call [facility] ([phone] or [phone]) or [phone] if needed.      Patient's BP goal is 130/80.Patient's BP average is 126/86 mmHg, which is above goal, per 2017 ACC/AHA Hypertension Guidelines.          Last 5 Patient Entered Readings                                      Current 30 Day Average: 126/86     Recent Readings 11/8/2019 11/7/2019 11/1/2019 10/24/2019 9/19/2019     SBP (mmHg) 135 120 128 121 128    DBP (mmHg) 85 82 88 87 89    Pulse 100 97 77 85 75            INTERVENTION(S)  recommended diet modifications, reviewed monitoring technique, encouragement/support, goal setting and low sodium recipes    PLAN  patient verbalizes understanding and patient amenable to changes    Encouraged and praised patient for already taking steps towards managing her hypertension.           Topic    Lipid (Cholesterol) Test        Reviewed the importance of self-monitoring, medication adherence, and that the health  can be used as a resource for lifestyle modifications to help reduce or maintain a healthy lifestyle.    Sent link to [facility] webpages and my contact information via [facility] [employer] for future questions. Follow up scheduled.             Diet Screening   Breakfast is typically between. Smoothie, oatmeal, bagel, muffin.  Lunch is typically between. Salad, sandwich.    Dinner is typically between. Beef tips and gravy, spaghetti and meat sauce, pasta, gumbo.    Snacks are typically. Trail mix, chips, .    Patient reports eating or drinking the following: fast food, fruit, water, fresh vegetables and deli meat    The patient drinks 60 ounces of water per day.    She has the following dietary restrictions: none    She eats 3 meals and 2 snacks per day.  She skips meals regularly.  Sometimes too busy  She has no standard fasting periods.      Patient did not have times when they could not afford food or ran out.      The patient states that she typically eats a non-home cooked meal (ex: dining out, take out, frozen meals) 5 or more times per week.  She cooks for self and has meals prepared by family.    Patient does the shopping for groceries and lets family grocery shop.  She gets groceries from the grocery store.      She does not find cooking difficult.      Eating style: relies on convenience items    Barriers to a Healthy Diet: no barriers to healthy eating    Patient  stated that she does add salt/seasoning to her food when she cooks and has tried some salt-free seasoning blends before and disliked them. Patient stated that she usually eats what is easy to grab because she is always on the go. She said that she does not eat/cook fried foods and does drink a healthy amount of water per day. Patient requested I send her some low-sodium recipes because she is always looking for ideas.     Physical Activity Screening   When asked if exercising, patient responded: yesHer level of intensity when exercising is low.    Patient participates in the following activities: walking    She identified the following barriers to physical activity: lack of time    Patient is a student at [employer] and stated that most of her exercise comes from walking across campus. Patient would like to get in the gym after the semester is over because she thinks she will have more time for the gym.     Intervention(s): goal setting     Assigning the following patient goal(s): increase physical activity    Medication Adherence Screening   She misses doses: never    Patient is selectively taking diuretics.    She does not wonder if medications are working.  She knows purpose of medications.      Patient identified the following reasons for non-compliance: none    Patient reported no questions or concerns about her medication.     Tobacco and Alcohol Screening       Patient is not eligible for referral to smoking cessation.      Alcohol urge triggers: drinks socially    Patient reported no tobacco use but social drinking on the weekends.       SDOH

## 2019-12-09 ENCOUNTER — PATIENT OUTREACH (OUTPATIENT)
Dept: OTHER | Facility: OTHER | Age: 24
End: 2019-12-09

## 2019-12-09 NOTE — LETTER
February 18, 2020     Christopher Webber  [address]  [place] [address]       Dear Christopher,    Thank you for enrolling in [facility] Program. To participate, we ask that you submit information at least once weekly through your [facility] account and maintain regular contact with your Care Team. We have not received any data or heard from you in some time.     The Digital Medicine Care Team has attempted to reach you on multiple occasions to determine if you would like to continue participating in the program. While we encourage you to continue participating fully, we understand that circumstances may change.     To continue participating in the program, please contact me at [phone]. If we do not hear back, you will be un-enrolled, and your physician will be notified of your decision.    If you have submitted data and believe you are receiving this letter in error, please call the Digital Medicine Patient Support Line at [phone] for troubleshooting.      We look forward to hearing from you soon.    Sincerely,     Gertrudis Baeza  Your Personal [facility]

## 2019-12-27 ENCOUNTER — PATIENT MESSAGE (OUTPATIENT)
Dept: ADMINISTRATIVE | Facility: OTHER | Age: 24
End: 2019-12-27

## 2019-12-27 ENCOUNTER — OFFICE VISIT (OUTPATIENT)
Dept: INTERNAL MEDICINE | Facility: CLINIC | Age: 24
End: 2019-12-27
Payer: COMMERCIAL

## 2019-12-27 ENCOUNTER — LAB VISIT (OUTPATIENT)
Dept: LAB | Facility: HOSPITAL | Age: 24
End: 2019-12-27
Attending: NURSE PRACTITIONER
Payer: COMMERCIAL

## 2019-12-27 VITALS
WEIGHT: 115.75 LBS | TEMPERATURE: 98 F | SYSTOLIC BLOOD PRESSURE: 130 MMHG | OXYGEN SATURATION: 100 % | HEART RATE: 70 BPM | DIASTOLIC BLOOD PRESSURE: 80 MMHG | BODY MASS INDEX: 21.3 KG/M2 | HEIGHT: 62 IN

## 2019-12-27 DIAGNOSIS — G47.419 PRIMARY NARCOLEPSY WITHOUT CATAPLEXY: ICD-10-CM

## 2019-12-27 DIAGNOSIS — R53.83 FATIGUE, UNSPECIFIED TYPE: ICD-10-CM

## 2019-12-27 DIAGNOSIS — Z00.00 ANNUAL PHYSICAL EXAM: Primary | ICD-10-CM

## 2019-12-27 DIAGNOSIS — G47.12 IDIOPATHIC HYPERSOMNIA WITHOUT LONG SLEEP TIME: ICD-10-CM

## 2019-12-27 DIAGNOSIS — Z13.220 SCREENING CHOLESTEROL LEVEL: ICD-10-CM

## 2019-12-27 DIAGNOSIS — Z00.00 ANNUAL PHYSICAL EXAM: ICD-10-CM

## 2019-12-27 DIAGNOSIS — K58.1 IRRITABLE BOWEL SYNDROME WITH CONSTIPATION: Chronic | ICD-10-CM

## 2019-12-27 DIAGNOSIS — I10 ESSENTIAL HYPERTENSION: ICD-10-CM

## 2019-12-27 PROBLEM — Z3A.01 LESS THAN 8 WEEKS GESTATION OF PREGNANCY: Status: RESOLVED | Noted: 2019-09-10 | Resolved: 2019-12-27

## 2019-12-27 LAB
ALBUMIN SERPL BCP-MCNC: 4.2 G/DL (ref 3.5–5.2)
ALP SERPL-CCNC: 77 U/L (ref 55–135)
ALT SERPL W/O P-5'-P-CCNC: 8 U/L (ref 10–44)
ANION GAP SERPL CALC-SCNC: 6 MMOL/L (ref 8–16)
AST SERPL-CCNC: 21 U/L (ref 10–40)
BASOPHILS # BLD AUTO: 0.04 K/UL (ref 0–0.2)
BASOPHILS NFR BLD: 0.7 % (ref 0–1.9)
BILIRUB SERPL-MCNC: 0.4 MG/DL (ref 0.1–1)
BUN SERPL-MCNC: 10 MG/DL (ref 6–20)
CALCIUM SERPL-MCNC: 9.6 MG/DL (ref 8.7–10.5)
CHLORIDE SERPL-SCNC: 106 MMOL/L (ref 95–110)
CHOLEST SERPL-MCNC: 212 MG/DL (ref 120–199)
CHOLEST/HDLC SERPL: 2.7 {RATIO} (ref 2–5)
CO2 SERPL-SCNC: 28 MMOL/L (ref 23–29)
CREAT SERPL-MCNC: 0.8 MG/DL (ref 0.5–1.4)
DIFFERENTIAL METHOD: ABNORMAL
EOSINOPHIL # BLD AUTO: 0.1 K/UL (ref 0–0.5)
EOSINOPHIL NFR BLD: 1.3 % (ref 0–8)
ERYTHROCYTE [DISTWIDTH] IN BLOOD BY AUTOMATED COUNT: 14.9 % (ref 11.5–14.5)
EST. GFR  (AFRICAN AMERICAN): >60 ML/MIN/1.73 M^2
EST. GFR  (NON AFRICAN AMERICAN): >60 ML/MIN/1.73 M^2
GLUCOSE SERPL-MCNC: 80 MG/DL (ref 70–110)
HCT VFR BLD AUTO: 43.1 % (ref 37–48.5)
HDLC SERPL-MCNC: 78 MG/DL (ref 40–75)
HDLC SERPL: 36.8 % (ref 20–50)
HGB BLD-MCNC: 13.7 G/DL (ref 12–16)
IMM GRANULOCYTES # BLD AUTO: 0.01 K/UL (ref 0–0.04)
IMM GRANULOCYTES NFR BLD AUTO: 0.2 % (ref 0–0.5)
LDLC SERPL CALC-MCNC: 123.2 MG/DL (ref 63–159)
LYMPHOCYTES # BLD AUTO: 1.9 K/UL (ref 1–4.8)
LYMPHOCYTES NFR BLD: 35.3 % (ref 18–48)
MCH RBC QN AUTO: 27.6 PG (ref 27–31)
MCHC RBC AUTO-ENTMCNC: 31.8 G/DL (ref 32–36)
MCV RBC AUTO: 87 FL (ref 82–98)
MONOCYTES # BLD AUTO: 0.5 K/UL (ref 0.3–1)
MONOCYTES NFR BLD: 8.3 % (ref 4–15)
NEUTROPHILS # BLD AUTO: 2.9 K/UL (ref 1.8–7.7)
NEUTROPHILS NFR BLD: 54.2 % (ref 38–73)
NONHDLC SERPL-MCNC: 134 MG/DL
NRBC BLD-RTO: 0 /100 WBC
PLATELET # BLD AUTO: 349 K/UL (ref 150–350)
PMV BLD AUTO: 9.9 FL (ref 9.2–12.9)
POTASSIUM SERPL-SCNC: 4 MMOL/L (ref 3.5–5.1)
PROT SERPL-MCNC: 7.7 G/DL (ref 6–8.4)
RBC # BLD AUTO: 4.97 M/UL (ref 4–5.4)
SODIUM SERPL-SCNC: 140 MMOL/L (ref 136–145)
TRIGL SERPL-MCNC: 54 MG/DL (ref 30–150)
TSH SERPL DL<=0.005 MIU/L-ACNC: 0.55 UIU/ML (ref 0.4–4)
WBC # BLD AUTO: 5.39 K/UL (ref 3.9–12.7)

## 2019-12-27 PROCEDURE — 90471 FLU VACCINE (QUAD) GREATER THAN OR EQUAL TO 3YO PRESERVATIVE FREE IM: ICD-10-PCS | Mod: S$GLB,,, | Performed by: NURSE PRACTITIONER

## 2019-12-27 PROCEDURE — 80053 COMPREHEN METABOLIC PANEL: CPT

## 2019-12-27 PROCEDURE — 90686 FLU VACCINE (QUAD) GREATER THAN OR EQUAL TO 3YO PRESERVATIVE FREE IM: ICD-10-PCS | Mod: S$GLB,,, | Performed by: NURSE PRACTITIONER

## 2019-12-27 PROCEDURE — 84443 ASSAY THYROID STIM HORMONE: CPT

## 2019-12-27 PROCEDURE — 36415 COLL VENOUS BLD VENIPUNCTURE: CPT

## 2019-12-27 PROCEDURE — 99395 PR PREVENTIVE VISIT,EST,18-39: ICD-10-PCS | Mod: 25,S$GLB,, | Performed by: NURSE PRACTITIONER

## 2019-12-27 PROCEDURE — 80061 LIPID PANEL: CPT

## 2019-12-27 PROCEDURE — 85025 COMPLETE CBC W/AUTO DIFF WBC: CPT

## 2019-12-27 PROCEDURE — 99999 PR PBB SHADOW E&M-EST. PATIENT-LVL III: CPT | Mod: PBBFAC,,, | Performed by: NURSE PRACTITIONER

## 2019-12-27 PROCEDURE — 99395 PREV VISIT EST AGE 18-39: CPT | Mod: 25,S$GLB,, | Performed by: NURSE PRACTITIONER

## 2019-12-27 PROCEDURE — 90686 IIV4 VACC NO PRSV 0.5 ML IM: CPT | Mod: S$GLB,,, | Performed by: NURSE PRACTITIONER

## 2019-12-27 PROCEDURE — 90471 IMMUNIZATION ADMIN: CPT | Mod: S$GLB,,, | Performed by: NURSE PRACTITIONER

## 2019-12-27 PROCEDURE — 99999 PR PBB SHADOW E&M-EST. PATIENT-LVL III: ICD-10-PCS | Mod: PBBFAC,,, | Performed by: NURSE PRACTITIONER

## 2019-12-27 NOTE — PROGRESS NOTES
"Christopher Webber 24 y.o. female     Chief Complaint:  Chief Complaint   Patient presents with    Annual Exam       History of Present Illness:  Pt is new to provider, but established in practice and presents with 20m/o dtr for annual.  C/o of recent loss interest in things, loss of appetite, fatigued, chronic anxiety. Hx of depression, has taken lexapro and wellbutrin in the past "but they stopped working." C/o bilateral shoulder tension. Wants her labs checked prior to resuming antidepressant.     Exam:  Review of Systems   Constitutional: Positive for malaise/fatigue. Negative for chills, diaphoresis, fever and weight loss.   HENT: Negative for hearing loss.    Eyes: Negative for blurred vision.   Respiratory: Negative for cough and shortness of breath.    Cardiovascular: Negative for chest pain, palpitations and leg swelling.   Gastrointestinal: Negative for abdominal pain, constipation, diarrhea, nausea and vomiting.   Genitourinary: Negative for dysuria and hematuria.   Musculoskeletal: Negative for myalgias.   Skin: Negative for rash.   Psychiatric/Behavioral: Positive for depression. The patient is nervous/anxious.       Physical Exam   Constitutional: She is oriented to person, place, and time. She appears well-developed and well-nourished. She is cooperative.  Non-toxic appearance. She does not appear ill. No distress.   HENT:   Head: Normocephalic and atraumatic.   Eyes: Pupils are equal, round, and reactive to light. Conjunctivae and EOM are normal. Right eye exhibits no discharge. Left eye exhibits no discharge. No scleral icterus.   Neck: Normal range of motion. No tracheal deviation present.   Cardiovascular: Normal rate and regular rhythm.   Pulmonary/Chest: Effort normal and breath sounds normal. No stridor. No respiratory distress. She has no wheezes. She has no rales. She exhibits no tenderness.   Abdominal: Soft. Bowel sounds are normal. She exhibits no distension. There is no tenderness. "   Musculoskeletal: Normal range of motion. She exhibits no edema.   Neurological: She is alert and oriented to person, place, and time.   Skin: Skin is warm, dry and intact. No rash noted. She is not diaphoretic. No erythema. No pallor.   Psychiatric: Her speech is normal and behavior is normal. Judgment and thought content normal. Cognition and memory are normal. She exhibits a depressed mood.   Nursing note and vitals reviewed.      Most Recent Laboratory Results Reviewed ({Yes)  Lab Results   Component Value Date    WBC 5.99 11/02/2018    HGB 12.2 11/02/2018    HCT 36.3 (L) 11/02/2018     (H) 11/02/2018    ALT 12 11/02/2018    AST 22 11/02/2018     11/02/2018    K 3.7 11/02/2018     11/02/2018    CREATININE 0.8 11/02/2018    BUN 6 11/02/2018    CO2 22 (L) 11/02/2018    TSH 1.077 11/02/2018    HGBA1C 5.0 11/02/2018       Assessment     ICD-10-CM ICD-9-CM   1. Annual physical exam Z00.00 V70.0   2. Irritable bowel syndrome with constipation K58.1 564.1   3. Primary narcolepsy without cataplexy G47.419 347.00   4. Idiopathic hypersomnia without long sleep time G47.12 327.12   5. Essential hypertension I10 401.9   6. Fatigue, unspecified type R53.83 780.79   7. Screening cholesterol level Z13.220 V77.91        Plan   Annual physical exam  -     CBC auto differential; Future; Expected date: 12/27/2019    Irritable bowel syndrome with constipation  - stable/managed     Primary narcolepsy without cataplexy  - does not require meds     Idiopathic hypersomnia without long sleep time    Essential hypertension  -     Comprehensive metabolic panel; Future; Expected date: 12/27/2019   controlled, continue procardia     Fatigue, unspecified type  -     TSH; Future; Expected date: 12/27/2019    Screening cholesterol level  -     Lipid panel; Future; Expected date: 12/27/2019    Other orders  -     Influenza - Quadrivalent (PF)         Follow up in about 1 year (around 12/27/2020).  Future Appointments      Date Specialty Appt Notes    12/27/2019 Lab mascagni

## 2019-12-28 ENCOUNTER — PATIENT MESSAGE (OUTPATIENT)
Dept: INTERNAL MEDICINE | Facility: CLINIC | Age: 24
End: 2019-12-28

## 2019-12-28 DIAGNOSIS — R53.83 FATIGUE, UNSPECIFIED TYPE: Primary | ICD-10-CM

## 2019-12-30 ENCOUNTER — PATIENT MESSAGE (OUTPATIENT)
Dept: ADMINISTRATIVE | Facility: OTHER | Age: 24
End: 2019-12-30

## 2020-01-01 DIAGNOSIS — F41.9 ANXIETY AND DEPRESSION: ICD-10-CM

## 2020-01-01 DIAGNOSIS — F32.A ANXIETY AND DEPRESSION: ICD-10-CM

## 2020-01-02 RX ORDER — ALPRAZOLAM 0.25 MG/1
TABLET ORAL
Qty: 20 TABLET | Refills: 0 | Status: SHIPPED | OUTPATIENT
Start: 2020-01-02 | End: 2020-02-21

## 2020-01-10 ENCOUNTER — PATIENT MESSAGE (OUTPATIENT)
Dept: INTERNAL MEDICINE | Facility: CLINIC | Age: 25
End: 2020-01-10

## 2020-01-10 DIAGNOSIS — F32.A ANXIETY AND DEPRESSION: Primary | ICD-10-CM

## 2020-01-10 DIAGNOSIS — F41.9 ANXIETY AND DEPRESSION: Primary | ICD-10-CM

## 2020-01-10 RX ORDER — BUPROPION HYDROCHLORIDE 75 MG/1
75 TABLET ORAL 2 TIMES DAILY
Qty: 60 TABLET | Refills: 0 | Status: SHIPPED | OUTPATIENT
Start: 2020-01-10 | End: 2020-03-19

## 2020-01-16 ENCOUNTER — PATIENT MESSAGE (OUTPATIENT)
Dept: INTERNAL MEDICINE | Facility: CLINIC | Age: 25
End: 2020-01-16

## 2020-02-13 ENCOUNTER — LAB VISIT (OUTPATIENT)
Dept: LAB | Facility: HOSPITAL | Age: 25
End: 2020-02-13
Attending: FAMILY MEDICINE
Payer: MEDICAID

## 2020-02-13 ENCOUNTER — TELEPHONE (OUTPATIENT)
Dept: INTERNAL MEDICINE | Facility: CLINIC | Age: 25
End: 2020-02-13

## 2020-02-13 DIAGNOSIS — Z32.00 POSSIBLE PREGNANCY, NOT CONFIRMED: ICD-10-CM

## 2020-02-13 DIAGNOSIS — Z32.00 POSSIBLE PREGNANCY, NOT CONFIRMED: Primary | ICD-10-CM

## 2020-02-13 PROCEDURE — 84702 CHORIONIC GONADOTROPIN TEST: CPT

## 2020-02-13 PROCEDURE — 36415 COLL VENOUS BLD VENIPUNCTURE: CPT

## 2020-02-13 RX ORDER — NORELGESTROMIN AND ETHINYL ESTRADIOL 35; 150 UG/MG; UG/MG
1 PATCH TRANSDERMAL
Qty: 3 PATCH | Refills: 0 | Status: SHIPPED | OUTPATIENT
Start: 2020-02-13 | End: 2020-02-13

## 2020-02-13 NOTE — TELEPHONE ENCOUNTER
----- Message from Katia Wheat sent at 2/13/2020  3:00 PM CST -----  Contact: self  Requesting a call back to reschedule an appt. Please call pt back at [phone].

## 2020-02-14 ENCOUNTER — PATIENT MESSAGE (OUTPATIENT)
Dept: OBSTETRICS AND GYNECOLOGY | Facility: CLINIC | Age: 25
End: 2020-02-14

## 2020-02-14 DIAGNOSIS — Z32.00 POSSIBLE PREGNANCY: Primary | ICD-10-CM

## 2020-02-14 LAB — HCG INTACT+B SERPL-ACNC: 827 MIU/ML

## 2020-02-16 ENCOUNTER — PATIENT MESSAGE (OUTPATIENT)
Dept: ADMINISTRATIVE | Facility: OTHER | Age: 25
End: 2020-02-16

## 2020-02-18 ENCOUNTER — TELEPHONE (OUTPATIENT)
Dept: INTERNAL MEDICINE | Facility: CLINIC | Age: 25
End: 2020-02-18

## 2020-02-18 ENCOUNTER — LAB VISIT (OUTPATIENT)
Dept: LAB | Facility: HOSPITAL | Age: 25
End: 2020-02-18
Attending: FAMILY MEDICINE
Payer: MEDICAID

## 2020-02-18 DIAGNOSIS — Z32.00 POSSIBLE PREGNANCY: ICD-10-CM

## 2020-02-18 LAB — HCG INTACT+B SERPL-ACNC: 693 MIU/ML

## 2020-02-18 PROCEDURE — 84702 CHORIONIC GONADOTROPIN TEST: CPT

## 2020-02-18 PROCEDURE — 36415 COLL VENOUS BLD VENIPUNCTURE: CPT

## 2020-02-18 NOTE — TELEPHONE ENCOUNTER
----- Message from Gertrudis Baeza sent at 2/18/2020  4:24 PM CST -----  Jose Bowen,    Your patient Christopher Webber was enrolled in [facility] Digital [facility] on 10/24/19. Unfortunately, She has requested discharge from Digital Medicine monitoring and we wanted to make you aware.     Thanks for your support of Digital Medicine programs! Please let me know if you any questions or concerns.    Sincerely,   Gertrudis Baeza

## 2020-02-19 ENCOUNTER — PATIENT MESSAGE (OUTPATIENT)
Dept: ADMINISTRATIVE | Facility: OTHER | Age: 25
End: 2020-02-19

## 2020-02-19 ENCOUNTER — PATIENT MESSAGE (OUTPATIENT)
Dept: OBSTETRICS AND GYNECOLOGY | Facility: CLINIC | Age: 25
End: 2020-02-19

## 2020-02-19 DIAGNOSIS — O03.9 MISCARRIAGE: Primary | ICD-10-CM

## 2020-02-20 DIAGNOSIS — F32.A ANXIETY AND DEPRESSION: ICD-10-CM

## 2020-02-20 DIAGNOSIS — F41.9 ANXIETY AND DEPRESSION: ICD-10-CM

## 2020-02-21 RX ORDER — ALPRAZOLAM 0.25 MG/1
TABLET ORAL
Qty: 20 TABLET | Refills: 0 | Status: SHIPPED | OUTPATIENT
Start: 2020-02-21 | End: 2020-05-06 | Stop reason: SDUPTHER

## 2020-03-03 ENCOUNTER — PATIENT MESSAGE (OUTPATIENT)
Dept: INTERNAL MEDICINE | Facility: CLINIC | Age: 25
End: 2020-03-03

## 2020-03-03 ENCOUNTER — PATIENT MESSAGE (OUTPATIENT)
Dept: OBSTETRICS AND GYNECOLOGY | Facility: CLINIC | Age: 25
End: 2020-03-03

## 2020-03-04 RX ORDER — ORPHENADRINE CITRATE 100 MG/1
100 TABLET, EXTENDED RELEASE ORAL 2 TIMES DAILY
Qty: 20 TABLET | Refills: 0 | Status: SHIPPED | OUTPATIENT
Start: 2020-03-04 | End: 2020-03-14

## 2020-03-06 ENCOUNTER — LAB VISIT (OUTPATIENT)
Dept: LAB | Facility: HOSPITAL | Age: 25
End: 2020-03-06
Attending: NURSE PRACTITIONER
Payer: MEDICAID

## 2020-03-06 DIAGNOSIS — O03.9 MISCARRIAGE: ICD-10-CM

## 2020-03-06 PROCEDURE — 36415 COLL VENOUS BLD VENIPUNCTURE: CPT

## 2020-03-06 PROCEDURE — 84702 CHORIONIC GONADOTROPIN TEST: CPT

## 2020-03-07 ENCOUNTER — PATIENT MESSAGE (OUTPATIENT)
Dept: OBSTETRICS AND GYNECOLOGY | Facility: CLINIC | Age: 25
End: 2020-03-07

## 2020-03-07 DIAGNOSIS — O03.9 MISCARRIAGE: Primary | ICD-10-CM

## 2020-03-07 LAB — HCG INTACT+B SERPL-ACNC: 227 MIU/ML

## 2020-03-07 RX ORDER — NORELGESTROMIN AND ETHINYL ESTRADIOL 35; 150 UG/MG; UG/MG
1 PATCH TRANSDERMAL
Qty: 4 PATCH | Refills: 11 | Status: SHIPPED | OUTPATIENT
Start: 2020-03-07 | End: 2020-06-17

## 2020-03-13 ENCOUNTER — PATIENT MESSAGE (OUTPATIENT)
Dept: OBSTETRICS AND GYNECOLOGY | Facility: CLINIC | Age: 25
End: 2020-03-13

## 2020-03-19 DIAGNOSIS — F41.9 ANXIETY AND DEPRESSION: ICD-10-CM

## 2020-03-19 DIAGNOSIS — F32.A ANXIETY AND DEPRESSION: ICD-10-CM

## 2020-03-19 RX ORDER — BUPROPION HYDROCHLORIDE 75 MG/1
TABLET ORAL
Qty: 60 TABLET | Refills: 0 | Status: SHIPPED | OUTPATIENT
Start: 2020-03-19 | End: 2020-05-06

## 2020-04-02 ENCOUNTER — PATIENT MESSAGE (OUTPATIENT)
Dept: INTERNAL MEDICINE | Facility: CLINIC | Age: 25
End: 2020-04-02

## 2020-04-03 ENCOUNTER — OFFICE VISIT (OUTPATIENT)
Dept: INTERNAL MEDICINE | Facility: CLINIC | Age: 25
End: 2020-04-03
Payer: MEDICAID

## 2020-04-03 DIAGNOSIS — M54.50 LOW BACK PAIN AT MULTIPLE SITES: ICD-10-CM

## 2020-04-03 DIAGNOSIS — D57.3 SICKLE CELL TRAIT: ICD-10-CM

## 2020-04-03 DIAGNOSIS — M54.2 NECK PAIN: Primary | ICD-10-CM

## 2020-04-03 PROCEDURE — 99213 OFFICE O/P EST LOW 20 MIN: CPT | Mod: 95,,, | Performed by: FAMILY MEDICINE

## 2020-04-03 PROCEDURE — 99213 PR OFFICE/OUTPT VISIT, EST, LEVL III, 20-29 MIN: ICD-10-PCS | Mod: 95,,, | Performed by: FAMILY MEDICINE

## 2020-04-03 RX ORDER — CYCLOBENZAPRINE HCL 10 MG
10 TABLET ORAL 3 TIMES DAILY PRN
Qty: 30 TABLET | Refills: 1 | Status: SHIPPED | OUTPATIENT
Start: 2020-04-03 | End: 2020-07-23

## 2020-04-03 RX ORDER — TRAMADOL HYDROCHLORIDE 50 MG/1
50 TABLET ORAL
Qty: 28 TABLET | Refills: 1 | Status: SHIPPED | OUTPATIENT
Start: 2020-04-03 | End: 2020-06-17

## 2020-04-03 NOTE — PROGRESS NOTES
The patient location is: [place] (Home)  The chief complaint leading to consultation is: neck pain, body pain  Visit type: Virtual visit with synchronous audio and video  Time start 1045  Time [id number]  Total time spent with patient: 13 minutes  Each patient to whom he or she provides medical services by telemedicine is:  (1) informed of the relationship between the physician and patient and the respective role of any other health care provider with respect to management of the patient; and (2) notified that he or she may decline to receive medical services by telemedicine and may withdraw from such care at any time.    Subjective:       Patient ID: Christopher Webber is a 24 y.o. female.    Chief Complaint: No chief complaint on file.    HPI Ms. Webber presents via telemedicine for body pain. This has come and gone for many months. She has tried different muscle relaxers in the past. She has not take NSAIDs because she was told not to with symptomatic GI symptoms.     Tightening of the back and neck muscles all day yesterday. Felt it subsided with tylenol.     Robaxin did not help  zanaflex made her sleepy and gave her bad dreams.     2-3 times a week she has symptoms   Sometimes it is an hour   Sickle cell trait -recently this has been considered because of the randomness of symptoms. It has been noted for people to have pain with sickle cell trait.     2 to 3 16 ounce bottles of water   Hot baths have not helped.     Review of Systems    see HPI for important ROS     Past Medical History:   Diagnosis Date    Essential hypertension 9/10/2019    Narcolepsy      Past Surgical History:   Procedure Laterality Date     SECTION      tonsilectomy       Objective:        Physical Exam      Results for orders placed or performed in visit on 20   hCG, quantitative   Result Value Ref Range    hCG Quant 227 See Text mIU/mL       Assessment/Plan:     Neck pain  -     cyclobenzaprine (FLEXERIL) 10 MG tablet; Take 1  tablet (10 mg total) by mouth 3 (three) times daily as needed for Muscle spasms.  Dispense: 30 tablet; Refill: 1  -     traMADoL (ULTRAM) 50 mg tablet; Take 1 tablet (50 mg total) by mouth every 4 to 6 hours as needed for Pain.  Dispense: 28 tablet; Refill: 1    Low back pain at multiple sites  -     cyclobenzaprine (FLEXERIL) 10 MG tablet; Take 1 tablet (10 mg total) by mouth 3 (three) times daily as needed for Muscle spasms.  Dispense: 30 tablet; Refill: 1  -     traMADoL (ULTRAM) 50 mg tablet; Take 1 tablet (50 mg total) by mouth every 4 to 6 hours as needed for Pain.  Dispense: 28 tablet; Refill: 1    Sickle cell trait  -     cyclobenzaprine (FLEXERIL) 10 MG tablet; Take 1 tablet (10 mg total) by mouth 3 (three) times daily as needed for Muscle spasms.  Dispense: 30 tablet; Refill: 1  -     traMADoL (ULTRAM) 50 mg tablet; Take 1 tablet (50 mg total) by mouth every 4 to 6 hours as needed for Pain.  Dispense: 28 tablet; Refill: 1      Considering sickle cell trait will treat her pain      Follow up as needed     Chantel Bowen MD  [facility]   Family Medicine

## 2020-04-24 ENCOUNTER — PATIENT MESSAGE (OUTPATIENT)
Dept: OBSTETRICS AND GYNECOLOGY | Facility: CLINIC | Age: 25
End: 2020-04-24

## 2020-04-27 RX ORDER — FLUCONAZOLE 150 MG/1
150 TABLET ORAL DAILY
Qty: 2 TABLET | Refills: 0 | Status: SHIPPED | OUTPATIENT
Start: 2020-04-27 | End: 2020-04-29

## 2020-04-29 RX ORDER — TRIAMCINOLONE ACETONIDE 1 MG/G
CREAM TOPICAL 2 TIMES DAILY
Qty: 15 G | Refills: 0 | Status: SHIPPED | OUTPATIENT
Start: 2020-04-29 | End: 2020-04-29

## 2020-04-29 RX ORDER — TERCONAZOLE 4 MG/G
7 CREAM VAGINAL NIGHTLY
Qty: 1 TUBE | Refills: 0 | Status: SHIPPED | OUTPATIENT
Start: 2020-04-29 | End: 2020-05-06

## 2020-05-06 ENCOUNTER — OFFICE VISIT (OUTPATIENT)
Dept: INTERNAL MEDICINE | Facility: CLINIC | Age: 25
End: 2020-05-06
Payer: MEDICAID

## 2020-05-06 DIAGNOSIS — I10 ESSENTIAL HYPERTENSION: Primary | ICD-10-CM

## 2020-05-06 DIAGNOSIS — F32.A ANXIETY AND DEPRESSION: ICD-10-CM

## 2020-05-06 DIAGNOSIS — F41.9 ANXIETY AND DEPRESSION: ICD-10-CM

## 2020-05-06 PROCEDURE — 99213 OFFICE O/P EST LOW 20 MIN: CPT | Mod: 95,,, | Performed by: FAMILY MEDICINE

## 2020-05-06 PROCEDURE — 99213 PR OFFICE/OUTPT VISIT, EST, LEVL III, 20-29 MIN: ICD-10-PCS | Mod: 95,,, | Performed by: FAMILY MEDICINE

## 2020-05-06 RX ORDER — ALPRAZOLAM 0.25 MG/1
0.25 TABLET ORAL 2 TIMES DAILY
Qty: 24 TABLET | Refills: 0 | Status: SHIPPED | OUTPATIENT
Start: 2020-05-06 | End: 2021-05-31 | Stop reason: SDUPTHER

## 2020-05-06 NOTE — PROGRESS NOTES
The patient location is: [place] ([place])  The chief complaint leading to consultation is: elevated blood pressure and birth control  Visit type: audiovisual   Time start 1024  Time end   Total time spent with patient:   Each patient to whom he or she provides medical services by telemedicine is:  (1) informed of the relationship between the physician and patient and the respective role of any other health care provider with respect to management of the patient; and (2) notified that he or she may decline to receive medical services by telemedicine and may withdraw from such care at any time.    Christopher Webber  24 y.o. Black or  female presents today via telemedicine for blood pressure concerns and birth control.     She stopped taking OCP on this past Sunday.   Yesterday it was 152/93  High over the past week or so  She has not checked her blood pressure yet today.   She had stopped the procardia when her blood pressure had returned to normal after her twins birth.     She started back taking it on Saturday.   She felt okay today and yesterday but again has not checked it yet.     Tramadol 50 mg did not help with her pain flares   The Flexeril helps but she has to take two of them.   She has been taking Xanax as needed for anxiety. She takes it every couple days.     Past Medical History:   Diagnosis Date    Essential hypertension 9/10/2019    Narcolepsy          Current Outpatient Medications:     ALPRAZolam (XANAX) 0.25 MG tablet, Take 1 tablet (0.25 mg total) by mouth 2 (two) times daily. as needed for anxiety., Disp: 24 tablet, Rfl: 0    magnesium 200 mg Tab, Take 1 tablet by mouth 2 (two) times daily as needed., Disp: , Rfl:     NIFEdipine (PROCARDIA-XL) 30 MG (OSM) 24 hr tablet, Take 1 tablet (30 mg total) by mouth once daily., Disp: 30 tablet, Rfl: 11    norelgestromin-ethinyl estradiol (ORTHO EVRA) 150-35 mcg/24 hr, Place 1 patch onto the skin every 7 days., Disp: 4 patch, Rfl: 11     terconazole (TERAZOL 7) 0.4 % Crea, Place 7 applicators vaginally every evening. for 7 days, Disp: 1 Tube, Rfl: 0    traMADoL (ULTRAM) 50 mg tablet, Take 1 tablet (50 mg total) by mouth every 4 to 6 hours as needed for Pain., Disp: 28 tablet, Rfl: 1    Review of patient's allergies indicates:   Allergen Reactions    Asa [aspirin] Other (See Comments)     IBS        ROS: Denies dizziness, headache, chest pain, shortness of breath or edema    PE:  GEN: Alert and oriented, no acute distress  HEART:no lower extremity edema  LUNGS: Respirations unlabored    ASSESSMENT/PLAN:    Essential hypertension  Stopped Birth control   Restarted procardia 30 mg which she had after delivering twin girls  Changing diet  Will monitor BP and will work on being able to come off medication again     Anxiety and depression-refilled medication   -     ALPRAZolam (XANAX) 0.25 MG tablet; Take 1 tablet (0.25 mg total) by mouth 2 (two) times daily. as needed for anxiety.  Dispense: 24 tablet; Refill: 0    Follow up as scheduled

## 2020-05-09 ENCOUNTER — PATIENT MESSAGE (OUTPATIENT)
Dept: INTERNAL MEDICINE | Facility: CLINIC | Age: 25
End: 2020-05-09

## 2020-05-11 ENCOUNTER — PATIENT MESSAGE (OUTPATIENT)
Dept: INTERNAL MEDICINE | Facility: CLINIC | Age: 25
End: 2020-05-11

## 2020-05-11 RX ORDER — AMLODIPINE BESYLATE 10 MG/1
10 TABLET ORAL DAILY
Qty: 90 TABLET | Refills: 0 | Status: SHIPPED | OUTPATIENT
Start: 2020-05-11 | End: 2020-08-25

## 2020-05-27 ENCOUNTER — PATIENT MESSAGE (OUTPATIENT)
Dept: INTERNAL MEDICINE | Facility: CLINIC | Age: 25
End: 2020-05-27

## 2020-05-27 DIAGNOSIS — F41.9 ANXIETY AND DEPRESSION: Primary | ICD-10-CM

## 2020-05-27 DIAGNOSIS — F32.A ANXIETY AND DEPRESSION: Primary | ICD-10-CM

## 2020-05-28 ENCOUNTER — PATIENT MESSAGE (OUTPATIENT)
Dept: INTERNAL MEDICINE | Facility: CLINIC | Age: 25
End: 2020-05-28

## 2020-06-17 ENCOUNTER — OFFICE VISIT (OUTPATIENT)
Dept: PSYCHIATRY | Facility: CLINIC | Age: 25
End: 2020-06-17
Payer: MEDICAID

## 2020-06-17 DIAGNOSIS — F33.0 DEPRESSION, MAJOR, RECURRENT, MILD: Primary | ICD-10-CM

## 2020-06-17 DIAGNOSIS — F32.A ANXIETY AND DEPRESSION: ICD-10-CM

## 2020-06-17 DIAGNOSIS — F41.9 ANXIETY AND DEPRESSION: ICD-10-CM

## 2020-06-17 DIAGNOSIS — F41.1 GENERALIZED ANXIETY DISORDER WITH PANIC ATTACKS: ICD-10-CM

## 2020-06-17 DIAGNOSIS — F41.0 GENERALIZED ANXIETY DISORDER WITH PANIC ATTACKS: ICD-10-CM

## 2020-06-17 PROCEDURE — 90792 PR PSYCHIATRIC DIAGNOSTIC EVALUATION W/MEDICAL SERVICES: ICD-10-PCS | Mod: 95,SA,HB, | Performed by: NURSE PRACTITIONER

## 2020-06-17 PROCEDURE — 90792 PSYCH DIAG EVAL W/MED SRVCS: CPT | Mod: 95,SA,HB, | Performed by: NURSE PRACTITIONER

## 2020-06-17 RX ORDER — SERTRALINE HYDROCHLORIDE 50 MG/1
50 TABLET, FILM COATED ORAL DAILY
Qty: 30 TABLET | Refills: 3 | Status: SHIPPED | OUTPATIENT
Start: 2020-06-17 | End: 2020-08-27
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
## 2020-06-17 NOTE — PATIENT INSTRUCTIONS
Depression  Depression is one of the most common mental health problems today. It is not just a state of unhappiness or sadness. It is a true disease. The cause seems to be related to a decrease in chemicals that transmit signals in the brain. Having a family history of depression, alcoholism, or suicide increases the risk. Chronic illness, chronic pain, migraine headaches and high emotional stress also increase the risk.  Depression is something we tend to recognize in others, but may have a hard time seeing in ourselves. It can show in many physical and emotional ways:  · Loss of appetite  · Over-eating  · Not being able to sleep  · Sleeping too much  · Tiredness not related to physical exertion  · Restlessness or irritability  · Slowness of movement or speech  · Feeling depressed or withdrawn  · Loss of interest in things you once enjoyed  · Trouble concentrating, poor memory, trouble making decisions  · Thoughts of harming or killing oneself, or thoughts that life is not worth living  · Low self-esteem  The treatment for depression may include both medicine and psychotherapy. Antidepressants can reduce suffering and can improve the ability to function during the depressed period. Therapy can offer emotional support and help you understand emotional factors that may be causing the depression.  Home care  · On-going care and support helps people manage this disease.  Find a healthcare provider and therapist who meet your needs. Seek help when you feel like you may be getting ill.  · Be kind to yourself. Make it a point to do things that you enjoy (gardening, walking in nature, going to a movie, etc.). Reward yourself for small successes.  · Take care of your physical body. Eat a balanced diet (low in saturated fat and high in fruits and vegetables). Exercise at least 3 times a week for 30 minutes. Even mild-moderate exercise (like brisk walking) can make you feel better.  · Avoid alcohol, which can make  depression worse.  · Take medicine as prescribed.  · Tell each of your healthcare providers about all of the prescription drugs, over-the-counter medicines, vitamins, and supplements you take. Certain supplements interact with medicines and can result in dangerous side effects. Ask your pharmacist when you have questions about drug interactions.  · Talk with your family and trusted friends about your feelings and thoughts. Ask them to help you recognize behavior changes early so you can get help and, if needed, medicine can be adjusted.  Follow-up care  Follow up with your healthcare provider, or as advised.  Call 911  Call 911 if you:  · Have suicidal thoughts, a suicide plan, and the means to carry out the plan  · Have trouble breathing  · Are very confused  · Feel very drowsy or have trouble awakening  · Faint or lose consciousness  · Have new chest pain that becomes more severe, lasts longer, or spreads into your shoulder, arm, neck, jaw or back  When to seek medical advice  Call your healthcare provider right away if any of these occur:  · Feeling extreme depression, fear, anxiety, or anger toward yourself or others  · Feeling out of control  · Feeling that you may try to harm yourself or another  · Hearing voices that others do not hear  · Seeing things that others do not see  · Cant sleep or eat for 3 days in a row  · Friends or family express concern over your behavior and ask you to seek help  Date Last Reviewed: 9/29/2015  © 3672-9006 [employer]. [address] [place] [address]. All rights reserved. This information is not intended as a substitute for professional medical care. Always follow your healthcare professional's instructions.          Understanding Anxiety Disorders  Almost everyone gets nervous now and then. Its normal to have knots in your stomach before a test, or for your heart to race on a first date. But an anxiety disorder is much more than a case of nerves. In  fact, its symptoms may be overwhelming. But treatment can relieve many of these symptoms. Talking to your healthcare provider is the first step.    What are anxiety disorders?  An anxiety disorder causes intense feelings of panic and fear. These feelings may arise for no apparent reason. And they tend to recur again and again. They may prevent you from coping with life and cause you great distress. As a result, you may avoid anything that triggers your fear. In extreme cases, you may never leave the house. Anxiety disorders may cause other symptoms, such as:  · Obsessive thoughts you cant control  · Constant nightmares or painful thoughts of the past  · Nausea, sweating, and muscle tension  · Trouble sleeping or concentrating  What causes anxiety disorders?  Anxiety disorders tend to run in families. For some people, childhood abuse or neglect may play a role. For others, stressful life events or trauma may trigger anxiety disorders. Anxiety can trigger low self-esteem and poor coping skills.  Common anxiety disorders  · Panic disorder. This causes an intense fear of being in danger.  · Phobias. These are extreme fears of certain objects, places, or events.  · Obsessive-compulsive disorder. This causes you to have unwanted thoughts and urges. You also may perform certain actions over and over.  · Posttraumatic stress disorder. This occurs in people who have survived a terrible ordeal. It can cause nightmares and flashbacks about the event.  · Generalized anxiety disorder. This causes constant worry that can greatly disrupt your life.   Getting better  You may believe that nothing can help you. Or, you might fear what others may think. But most anxiety symptoms can be eased. Having an anxiety disorder is nothing to be ashamed of. Most people do best with treatment that combines medicine and therapy. These arent cures. But they can help you live a healthier life.  Date Last Reviewed: 2/1/2017  © 5216-1827 [employer]. [address] [place] [address], [place] [address]. All rights reserved. This information is not intended as a substitute for professional medical care. Always follow your healthcare professional's instructions.

## 2020-07-24 ENCOUNTER — OFFICE VISIT (OUTPATIENT)
Dept: INTERNAL MEDICINE | Facility: CLINIC | Age: 25
End: 2020-07-24
Payer: MEDICAID

## 2020-07-24 VITALS — SYSTOLIC BLOOD PRESSURE: 126 MMHG | DIASTOLIC BLOOD PRESSURE: 76 MMHG

## 2020-07-24 DIAGNOSIS — M54.2 NECK PAIN: ICD-10-CM

## 2020-07-24 DIAGNOSIS — M54.50 LOW BACK PAIN AT MULTIPLE SITES: ICD-10-CM

## 2020-07-24 DIAGNOSIS — D57.3 SICKLE CELL TRAIT: ICD-10-CM

## 2020-07-24 PROCEDURE — 99213 OFFICE O/P EST LOW 20 MIN: CPT | Mod: 95,,, | Performed by: FAMILY MEDICINE

## 2020-07-24 PROCEDURE — 99213 PR OFFICE/OUTPT VISIT, EST, LEVL III, 20-29 MIN: ICD-10-PCS | Mod: 95,,, | Performed by: FAMILY MEDICINE

## 2020-07-24 RX ORDER — CYCLOBENZAPRINE HCL 10 MG
TABLET ORAL
Qty: 30 TABLET | Refills: 1 | Status: SHIPPED | OUTPATIENT
Start: 2020-07-24 | End: 2021-08-31 | Stop reason: SDUPTHER

## 2020-07-24 NOTE — PROGRESS NOTES
"Subjective:       Patient ID: Christopher Webber is a 24 y.o. female.    Chief Complaint: No chief complaint on file.    Back Pain  This is a chronic problem. The current episode started more than 1 year ago. The problem occurs every several days. The problem is unchanged. The pain is present in the lumbar spine, sacro-iliac and thoracic spine. The quality of the pain is described as aching and shooting. The pain does not radiate. The pain is moderate. The pain is worse during the night. The symptoms are aggravated by bending, position, lying down, standing and stress. Stiffness is present at night and all day. Pertinent negatives include no abdominal pain, bladder incontinence, bowel incontinence, chest pain, dysuria, fever, headaches, leg pain, numbness, paresis, paresthesias, pelvic pain, perianal numbness, tingling, weakness or weight loss. She has tried analgesics, NSAIDs, acupuncture, chiropractic manipulation, heat, home exercises, ice and muscle relaxant for the symptoms. The treatment provided moderate relief.     Refill for meds for back pain  Reports being screened for sickle cell trait  unk what causes flare   Ice/heat, chiropractor, icy hot, stretches  Has been tinzadine   Flexeril works  Difficulties asa causing stomach lining to be irritated "tore up"  Review of Systems   Constitutional: Negative for fever and weight loss.   Cardiovascular: Negative for chest pain.   Gastrointestinal: Negative for abdominal pain and bowel incontinence.   Genitourinary: Negative for bladder incontinence, dysuria, hematuria and pelvic pain.   Musculoskeletal: Positive for back pain.   Neurological: Negative for tingling, weakness, numbness, headaches and paresthesias.        Objective:   /76     BP Readings from Last 3 Encounters:   07/24/20 126/76   02/13/20 126/76   12/27/19 130/80       Lab Results   Component Value Date    HGBA1C 5.0 11/02/2018       Physical Exam  Constitutional:       General: She is not in " acute distress.     Appearance: She is not ill-appearing or toxic-appearing.   HENT:      Head: Normocephalic and atraumatic.   Neck:      Musculoskeletal: Normal range of motion.   Pulmonary:      Effort: Pulmonary effort is normal. No respiratory distress.      Comments: Able to speak in full sentences without respiratory distress    Neurological:      Mental Status: She is alert.   Psychiatric:         Mood and Affect: Mood normal.         Behavior: Behavior normal.         Thought Content: Thought content normal.         Judgment: Judgment normal.       Assessment:     1. Neck pain    2. Low back pain at multiple sites    3. Sickle cell trait      Plan:     Problem List Items Addressed This Visit     None      Visit Diagnoses     Neck pain        Relevant Medications    cyclobenzaprine (FLEXERIL) 10 MG tablet    Low back pain at multiple sites        Relevant Medications    cyclobenzaprine (FLEXERIL) 10 MG tablet    Sickle cell trait        Relevant Medications    cyclobenzaprine (FLEXERIL) 10 MG tablet        The patient location is: home  The chief complaint leading to consultation is: back pain    Visit type: audiovisual    Face to Face time with patient: 11:12-11:16  4 minutes of total time spent on the encounter, which includes face to face time and non-face to face time preparing to see the patient (eg, review of tests), Obtaining and/or reviewing separately obtained history, Documenting clinical information in the electronic or other health record, Independently interpreting results (not separately reported) and communicating results to the patient/family/caregiver, or Care coordination (not separately reported).         Each patient to whom he or she provides medical services by telemedicine is:  (1) informed of the relationship between the physician and patient and the respective role of any other health care provider with respect to management of the patient; and (2) notified that he or she may decline  to receive medical services by telemedicine and may withdraw from such care at any time.    Notes:     No follow-ups on file.    Answers for HPI/ROS submitted by the patient on 7/24/2020   Back pain  genital pain: No

## 2020-08-23 DIAGNOSIS — F41.9 ANXIETY AND DEPRESSION: ICD-10-CM

## 2020-08-23 DIAGNOSIS — F32.A ANXIETY AND DEPRESSION: ICD-10-CM

## 2020-08-25 RX ORDER — AMLODIPINE BESYLATE 10 MG/1
TABLET ORAL
Qty: 90 TABLET | Refills: 0 | Status: SHIPPED | OUTPATIENT
Start: 2020-08-25 | End: 2020-12-04 | Stop reason: SDUPTHER

## 2020-08-25 RX ORDER — ALPRAZOLAM 0.25 MG/1
TABLET ORAL
Qty: 24 TABLET | OUTPATIENT
Start: 2020-08-25

## 2020-08-27 ENCOUNTER — OFFICE VISIT (OUTPATIENT)
Dept: PSYCHIATRY | Facility: CLINIC | Age: 25
End: 2020-08-27
Payer: MEDICAID

## 2020-08-27 DIAGNOSIS — F41.0 GENERALIZED ANXIETY DISORDER WITH PANIC ATTACKS: Primary | ICD-10-CM

## 2020-08-27 DIAGNOSIS — F41.1 GENERALIZED ANXIETY DISORDER WITH PANIC ATTACKS: Primary | ICD-10-CM

## 2020-08-27 DIAGNOSIS — F40.298 FEAR OF SIDE EFFECTS OF MEDICATION: ICD-10-CM

## 2020-08-27 PROCEDURE — 99214 OFFICE O/P EST MOD 30 MIN: CPT | Mod: 95,SA,HB, | Performed by: NURSE PRACTITIONER

## 2020-08-27 PROCEDURE — 99214 PR OFFICE/OUTPT VISIT, EST, LEVL IV, 30-39 MIN: ICD-10-PCS | Mod: 95,SA,HB, | Performed by: NURSE PRACTITIONER

## 2020-08-27 RX ORDER — BUPROPION HYDROCHLORIDE 75 MG/1
75 TABLET ORAL DAILY
Qty: 30 TABLET | Refills: 1 | Status: SHIPPED | OUTPATIENT
Start: 2020-08-27 | End: 2021-02-02 | Stop reason: SDUPTHER

## 2020-08-27 RX ORDER — SERTRALINE HYDROCHLORIDE 100 MG/1
100 TABLET, FILM COATED ORAL DAILY
Qty: 30 TABLET | Refills: 0 | Status: SHIPPED | OUTPATIENT
Start: 2020-08-27 | End: 2020-09-30 | Stop reason: SDUPTHER

## 2020-08-27 RX ORDER — HYDROXYZINE HYDROCHLORIDE 10 MG/1
TABLET, FILM COATED ORAL
Qty: 60 TABLET | Refills: 1 | Status: SHIPPED | OUTPATIENT
Start: 2020-08-27 | End: 2021-05-31

## 2020-08-27 NOTE — PROGRESS NOTES
Outpatient Psychiatry Follow-Up Visit (MD/NP)      Disclaimer: Evaluation and treatment is based on information presented to date. Any new information may affect assessment and findings.       The patient location is: home      Visit type: audiovisual      34 minutes of total time spent on the encounter, which includes face to face time and non-face to face time preparing to see the patient (eg, review of tests), Obtaining and/or reviewing separately obtained history, Documenting clinical information in the electronic or other health record, Independently interpreting results (not separately reported) and communicating results to the patient/family/caregiver, or Care coordination (not separately reported).         Each patient to whom he or she provides medical services by telemedicine is:  (1) informed of the relationship between the physician and patient and the respective role of any other health care provider with respect to management of the patient; and (2) notified that he or she may decline to receive medical services by telemedicine and may withdraw from such care at any time.              Clinical Status of Patient:  Outpatient (Ambulatory)    Chief Complaint:  Christopher Webber is a 24 y.o. female who presents today for follow-up of depression and anxiety.  Met with patient.      Interval History and Content of Current Session:  Interim Events/Subjective Report/Content of Current Session: reviewed questionnaires with patient. GAGE, PHQ  GAD7 8/27/2020   1. Feeling nervous, anxious, or on edge? 1   2. Not being able to stop or control worrying? 1   3. Worrying too much about different things? 1   4. Trouble relaxing? 2   5. Being so restless that it is hard to sit still? 1   6. Becoming easily annoyed or irritable? 3   7. Feeling afraid as if something awful might happen? 1   GAGE-7 Score 10     Little interest or pleasure in doing things: (P) Several days  Feeling down, depressed, or hopeless: (P) Not at  "all  Trouble falling or staying asleep, or sleeping too much: (P) Several days  Feeling tired or having little energy: (P) Not at all  Poor appetite or overeating: (P) Several days  Feeling bad about yourself - or that you are a failure or have let yourself or your family down: (P) Not at all  Trouble concentrating on things, such as reading the newspaper or watching television: (P) Not at all  Moving or speaking so slowly that other people could have noticed. Or the opposite - being so fidgety or restless that you have been moving around a lot more than usual: (P) Not at all  PHQ-9 Total Score: (P) 3     Patient never returned for her follow up appt  Has not been seen since September.  She sent message that she wanted refill for xanax, which was prescribed by PCP.  PCP will not prescribe any longer.    States her anxiety has been " about a 7 out of a 10"  She is not wanting to take a medication on a daily basis for this.  Discussed the disease process, as it refers to depression and anxiety and why it is important to treat this and not just the symptoms, how you can treat both.  Discussed the risks of using benzos on a regular basis.    She states she has only had 1 refill; however,  shows she has been taking since march of 2019.   Discussed use of atarax to treat symptoms.  She is concerned about sexual side effects, and is requesting to use wellbutrin for this.  She has taken this in the past.    Verbalizes she does have some depressive sx, not wanting to get out of bed on some days, feeling down, but mostly anxiety.  Sleeping 7-8 hours a night  She appears irritated throughout the assessment; she interrupts NP frequently; is demanding about getting xanax, though she was told at each visit and through messages that NP would not be prescribing xanax to her due to the risks and it is used short term only. She becomes angry about this.      Depressive Disorder: REPORTS depressed mood, irritable mood, " "concentration problems.   Anxiety Disorder: panic attacks, hyperarousal symptoms, irritability, muscle tension, concentration problems, excessive worry.   Manic Disorder: DENIES none.   Psychotic Disorder: DENIES none.   Substance Use:  DENIES none.   Physical or Sexual Abuse: DENIES none.       Review of Systems   · PSYCHIATRIC: Pertinant items are noted in the narrative.    Past Medical, Family and Social History: The patient's past medical, family and social history have been reviewed and updated as appropriate within the electronic medical record - see encounter notes.    Compliance: yes    Side effects: None    Risk Parameters:  Patient reports no suicidal ideation  Patient reports no homicidal ideation  Patient reports no self-injurious behavior  Patient reports no violent behavior    Exam (detailed: at least 9 elements; comprehensive: all 15 elements)   Constitutional  Vitals:  Most recent vital signs, dated less than 90 days prior to this appointment, were reviewed.   Last 3 sets of Vitals    Vitals - 1 value per visit 12/27/2019 2/13/2020 7/24/2020   SYSTOLIC 130 126 126   DIASTOLIC 80 76 76   PULSE 70 - -   TEMPERATURE 98 - -   RESPIRATIONS - - -   SPO2 100 - -   Weight (lb) 115.74 119.93 -   Weight (kg) 52.5 54.4 -   HEIGHT 5' 2" 5' 2" -   BODY MASS INDEX 21.17 21.94 -   VISIT REPORT - - -   Pain Score  0 0 -   Some recent data might be hidden          General:  unremarkable, age appropriate, casually dressed, neatly groomed     Musculoskeletal  Muscle Strength/Tone:  not examined   Gait & Station:  non-ataxic     Psychiatric  Speech:  no latency; no press   Mood & Affect:  angry  congruent and appropriate   Thought Process:  normal and logical   Associations:  intact   Thought Content:  normal, no suicidality, no homicidality, delusions, or paranoia   Insight:  intact, has awareness of illness   Judgement: behavior is adequate to circumstances, age appropriate   Orientation:  grossly intact   Memory: " intact for content of interview   Language: grossly intact   Attention Span & Concentration:  able to focus   Fund of Knowledge:  intact and appropriate to age and level of education, familiar with aspects of current personal life     Assessment and Diagnosis   Status/Progress: Based on the examination today, the patient's problem(s) is/are worsening.  New problems have not been presented today.   not wanting to stop using xanax are complicating management of the primary condition.  The working differential for this patient includes benzodiazipine dependent.     General Impression:     Encounter Diagnoses   Name Primary?    Fear of side effects of medication     Generalized anxiety disorder with panic attacks Yes         Intervention/Counseling/Treatment Plan   · Medication Management: The risks and benefits of medication were discussed with the patient.  · Increase zoloft to 100 mg for more therapeutic effect on anxiety  · Add low dose wellbutrin 75 mg tablet, she may take half, for sexual side effects  · Add atarax 10 mg for panic attacks and anxiety    Reviewed pathophysiology of depression and anxiety. Discussed rationale behind treatment. Reviewed treatment options, including medication and psychotherapy. Reviewed pharmacology including onset of action, dosing, side effects, adverse reactions and duration of therapy (6-12 months).  Discussed the need to stay on medication if it is effective and not discontinue after a few weeks due to the probability of relapse.    Pt expressed appreciation for the visit today and did not have further question at this time though pt  was still informed to:     Call / Walk In if problems.    Call Report Side Effects to Psyc MD     Encouraged to follow up with primary care / Gen Med MD for continued monitoring of general health and wellness.    Call 911  Or go to ER if Acute Concerns (especially if any thoughts of harm to self or other).     Understanding was expressed; and no  further concerns nor questions were raised at this time.        Return to Clinic: 2 weeks

## 2020-08-28 NOTE — PATIENT INSTRUCTIONS
Understanding Anxiety Disorders  Almost everyone gets nervous now and then. Its normal to have knots in your stomach before a test, or for your heart to race on a first date. But an anxiety disorder is much more than a case of nerves. In fact, its symptoms may be overwhelming. But treatment can relieve many of these symptoms. Talking to your healthcare provider is the first step.    What are anxiety disorders?  An anxiety disorder causes intense feelings of panic and fear. These feelings may arise for no apparent reason. And they tend to recur again and again. They may prevent you from coping with life and cause you great distress. As a result, you may avoid anything that triggers your fear. In extreme cases, you may never leave the house. Anxiety disorders may cause other symptoms, such as:  · Obsessive thoughts you cant control  · Constant nightmares or painful thoughts of the past  · Nausea, sweating, and muscle tension  · Trouble sleeping or concentrating  What causes anxiety disorders?  Anxiety disorders tend to run in families. For some people, childhood abuse or neglect may play a role. For others, stressful life events or trauma may trigger anxiety disorders. Anxiety can trigger low self-esteem and poor coping skills.  Common anxiety disorders  · Panic disorder. This causes an intense fear of being in danger.  · Phobias. These are extreme fears of certain objects, places, or events.  · Obsessive-compulsive disorder. This causes you to have unwanted thoughts and urges. You also may perform certain actions over and over.  · Posttraumatic stress disorder. This occurs in people who have survived a terrible ordeal. It can cause nightmares and flashbacks about the event.  · Generalized anxiety disorder. This causes constant worry that can greatly disrupt your life.   Getting better  You may believe that nothing can help you. Or, you might fear what others may think. But most anxiety symptoms can be eased.  Having an anxiety disorder is nothing to be ashamed of. Most people do best with treatment that combines medicine and therapy. These arent cures. But they can help you live a healthier life.  Date Last Reviewed: 2/1/2017  © 6086-9725 [employer]. [address] [place] [address], [place] [address]. All rights reserved. This information is not intended as a substitute for professional medical care. Always follow your healthcare professional's instructions.

## 2020-09-01 ENCOUNTER — PATIENT MESSAGE (OUTPATIENT)
Dept: INTERNAL MEDICINE | Facility: CLINIC | Age: 25
End: 2020-09-01

## 2020-09-01 DIAGNOSIS — M54.50 LOW BACK PAIN AT MULTIPLE SITES: ICD-10-CM

## 2020-09-01 DIAGNOSIS — M54.2 NECK PAIN: Primary | ICD-10-CM

## 2020-09-02 RX ORDER — TRAMADOL HYDROCHLORIDE 50 MG/1
50 TABLET ORAL EVERY 6 HOURS
Qty: 28 TABLET | Refills: 0 | Status: SHIPPED | OUTPATIENT
Start: 2020-09-02 | End: 2021-08-31 | Stop reason: SDUPTHER

## 2020-09-18 ENCOUNTER — OFFICE VISIT (OUTPATIENT)
Dept: OBSTETRICS AND GYNECOLOGY | Facility: CLINIC | Age: 25
End: 2020-09-18
Payer: MEDICAID

## 2020-09-18 ENCOUNTER — TELEPHONE (OUTPATIENT)
Dept: OBSTETRICS AND GYNECOLOGY | Facility: CLINIC | Age: 25
End: 2020-09-18

## 2020-09-18 VITALS
WEIGHT: 116.06 LBS | SYSTOLIC BLOOD PRESSURE: 122 MMHG | HEIGHT: 62 IN | BODY MASS INDEX: 21.36 KG/M2 | DIASTOLIC BLOOD PRESSURE: 84 MMHG

## 2020-09-18 DIAGNOSIS — N76.0 ACUTE VAGINITIS: Primary | ICD-10-CM

## 2020-09-18 LAB
GARDNERELLA VAGINALIS: NORMAL
OTHER MICROSC. OBSERVATIONS: NORMAL
POC BACTERIAL VAGINOSIS: NORMAL
POC CLUE CELLS: NEGATIVE
TRICHOMONAS, POC: NEGATIVE
YEAST WET PREP: NEGATIVE

## 2020-09-18 PROCEDURE — 99999 PR PBB SHADOW E&M-EST. PATIENT-LVL III: CPT | Mod: PBBFAC,,, | Performed by: NURSE PRACTITIONER

## 2020-09-18 PROCEDURE — 99213 OFFICE O/P EST LOW 20 MIN: CPT | Mod: PBBFAC,PN | Performed by: NURSE PRACTITIONER

## 2020-09-18 PROCEDURE — 87210 SMEAR WET MOUNT SALINE/INK: CPT | Mod: PBBFAC,PN | Performed by: NURSE PRACTITIONER

## 2020-09-18 PROCEDURE — 99213 PR OFFICE/OUTPT VISIT, EST, LEVL III, 20-29 MIN: ICD-10-PCS | Mod: S$PBB,,, | Performed by: NURSE PRACTITIONER

## 2020-09-18 PROCEDURE — 99999 PR PBB SHADOW E&M-EST. PATIENT-LVL III: ICD-10-PCS | Mod: PBBFAC,,, | Performed by: NURSE PRACTITIONER

## 2020-09-18 PROCEDURE — 99213 OFFICE O/P EST LOW 20 MIN: CPT | Mod: S$PBB,,, | Performed by: NURSE PRACTITIONER

## 2020-09-18 NOTE — PROGRESS NOTES
Subjective:       Patient ID: Christopher Webber is a 24 y.o. female.    Chief Complaint:  Routine Prenatal Visit      History of Present Illness  HPI   present for vaginal irritation for the last four days.  No discharge, itching or burning. Noticed symptoms started after menses ended  Pt is in a mutually monogamous relationship with one partner  Showers with dove; denies douching  Denies abdominal pain  Did start using tampax tampons again with the last two cycles; changing q3-4h  Pt primarily sees Dr. Jord[facility] Lna at [facility] and had her annual with pap with her 2020    GYN & OB History  Patient's last menstrual period was 2020.   Date of Last Pap: 3/9/2017    OB History    Para Term  AB Living   1 1 0 1 0 2   SAB TAB Ectopic Multiple Live Births   0 0 0 1 2      # Outcome Date GA Lbr Juan Manuel/2nd Weight Sex Delivery Anes PTL Lv   1A  18 29w2d   F CS-Unspec   DANIAL   1B  18 29w2d   F CS-LTranv   DANIAL       Review of Systems  Review of Systems   Constitutional: Negative for chills, fatigue and fever.   Gastrointestinal: Negative for abdominal pain.   Genitourinary: Negative for dysuria, frequency, menstrual problem, urgency, vaginal discharge, vaginal pain, urinary incontinence, vaginal dryness and vaginal odor.        Vaginal irritation   All other systems reviewed and are negative.          Objective:      Physical Exam:   Constitutional: She is oriented to person, place, and time. She appears well-developed and well-nourished. No distress.    HENT:   Head: Normocephalic and atraumatic.    Eyes: Pupils are equal, round, and reactive to light. Conjunctivae and EOM are normal.    Neck: Normal range of motion. Neck supple.    Cardiovascular: Normal rate.     Pulmonary/Chest: Effort normal.        Abdominal: Soft. Hernia confirmed negative in the right inguinal area and confirmed negative in the left inguinal area.     Genitourinary:    Inguinal canal, vagina, uterus, right  adnexa and left adnexa normal.   Rectum:      No external hemorrhoid.            Pelvic exam was performed with patient supine.   There is no rash, tenderness, lesion or injury on the right labia. There is no rash, tenderness, lesion or injury on the left labia. Uterus is not enlarged and not tender. Cervix is normal. There is a normal right adnexa and a normal left adnexa. Right adnexum displays no mass, no tenderness and no fullness. Left adnexum displays no mass, no tenderness and no fullness. No erythema, tenderness or bleeding in the vagina.    No foreign body in the vagina.      No signs of injury in the vagina.   Cervix exhibits no motion tenderness and no friability.    Genitourinary Comments: Wet prep- occasional clue cells, no yeast, or trich   negative for vaginal discharge       Uterus Size: 8 cm   Musculoskeletal: Normal range of motion and moves all extremeties.      Lymphadenopathy: No inguinal adenopathy noted on the right or left side.    Neurological: She is alert and oriented to person, place, and time.    Skin: Skin is warm and dry. No rash noted. She is not diaphoretic. No erythema. No pallor.    Psychiatric: She has a normal mood and affect. Her behavior is normal. Judgment and thought content normal.           Assessment:        1. Acute vaginitis               Plan:   Keep vaginal area clean and dry; inspect area and apply aquaphor twice daily; change brand of tampons  Continue annual well woman exam.    Acute vaginitis  -     POCT Wet Prep

## 2020-09-18 NOTE — TELEPHONE ENCOUNTER
----- Message from Alicia Connors sent at 9/18/2020 10:05 AM CDT -----  Regarding: possible UTI  Type:  Sooner Apoointment Request    Caller is requesting a sooner appointment.  Caller declined first available appointment listed below.  Caller will not accept being placed on the waitlist and is requesting a message be sent to doctor.  Name of Caller:p[t  When is the first available appointmen tn/a  Symptoms: possible UTI  Would the patient rather a call back or a response via [facility]? Call back  Best Call Back Number:[phone]]  Additional Information: Please call back. Thanks

## 2020-09-28 ENCOUNTER — OFFICE VISIT (OUTPATIENT)
Dept: FAMILY MEDICINE | Facility: CLINIC | Age: 25
End: 2020-09-28
Payer: MEDICAID

## 2020-09-28 DIAGNOSIS — B37.31 VAGINAL CANDIDIASIS: Primary | ICD-10-CM

## 2020-09-28 PROCEDURE — 99213 PR OFFICE/OUTPT VISIT, EST, LEVL III, 20-29 MIN: ICD-10-PCS | Mod: 95,,, | Performed by: STUDENT IN AN ORGANIZED HEALTH CARE EDUCATION/TRAINING PROGRAM

## 2020-09-28 PROCEDURE — 99213 OFFICE O/P EST LOW 20 MIN: CPT | Mod: 95,,, | Performed by: STUDENT IN AN ORGANIZED HEALTH CARE EDUCATION/TRAINING PROGRAM

## 2020-09-28 RX ORDER — FLUCONAZOLE 150 MG/1
150 TABLET ORAL DAILY
Qty: 1 TABLET | Refills: 0 | Status: SHIPPED | OUTPATIENT
Start: 2020-09-28 | End: 2020-09-29

## 2020-09-28 NOTE — PROGRESS NOTES
Subjective:      Patient ID: Christopher Webber is a 24 y.o. female.    Chief Complaint: No chief complaint on file.  The patient location is: Home ([place])  The chief complaint leading to consultation is: Yeast infection    Visit type: audiovisual    Face to Face time with patient: 6 min  10 minutes of total time spent on the encounter, which includes face to face time and non-face to face time preparing to see the patient (eg, review of tests), Obtaining and/or reviewing separately obtained history, Documenting clinical information in the electronic or other health record, Independently interpreting results (not separately reported) and communicating results to the patient/family/caregiver, or Care coordination (not separately reported).         Each patient to whom he or she provides medical services by telemedicine is:  (1) informed of the relationship between the physician and patient and the respective role of any other health care provider with respect to management of the patient; and (2) notified that he or she may decline to receive medical services by telemedicine and may withdraw from such care at any time.        HPI: 25 yo female with PMH of depression, HTN, and anxiety presents today for yeast infection. Symptoms began 3-4 days ago following antibiotic treatment for sinus infection. Reports vaginal itching and discharge. States discharge is white in color. Denies any vaginal odor. Pt states she has had yeast infections before and this feels the same. Pt is not concerned with STD's. States she is in a mutually monogamous relationship. Denies any lower abdominal pain, dysuria, urinary frequency or urgency.     Past Medical History:   Diagnosis Date    Depression, major, recurrent, mild 2020    Essential hypertension 9/10/2019    History of Narcolepsy     10 years ago     Past Surgical History:   Procedure Laterality Date     SECTION      TONSILLECTOMY       Family History   Problem  Relation Age of Onset    Breast cancer Neg Hx     Cancer Neg Hx     Colon cancer Neg Hx     Diabetes Neg Hx     Eclampsia Neg Hx     Hypertension Neg Hx     Miscarriages / Stillbirths Neg Hx      labor Neg Hx     Ovarian cancer Neg Hx     Stroke Neg Hx      Social History     Socioeconomic History    Marital status: Single     Spouse name: Not on file    Number of children: Not on file    Years of education: Not on file    Highest education level: Not on file   Occupational History    Not on file   Social Needs    Financial resource strain: Not very hard    Food insecurity     Worry: Never true     Inability: Never true    Transportation needs     Medical: No     Non-medical: No   Tobacco Use    Smoking status: Never Smoker    Smokeless tobacco: Never Used   Substance and Sexual Activity    Alcohol use: Yes     Alcohol/week: 0.0 standard drinks     Frequency: Monthly or less     Comment: social    Drug use: No    Sexual activity: Yes     Partners: Male     Birth control/protection: None   Lifestyle    Physical activity     Days per week: Not on file     Minutes per session: Not on file    Stress: Not on file   Relationships    Social connections     Talks on phone: More than three times a week     Gets together: Twice a week     Attends [employer] service: More than 4 times per year     Active member of club or organization: Yes     Attends meetings of clubs or organizations: 1 to 4 times per year     Relationship status: Living with partner   Other Topics Concern    Not on file   Social History Narrative    Not on file     Review of patient's allergies indicates:   Allergen Reactions    Asa [aspirin] Other (See Comments)     IBS        Review of Systems   Constitutional: Negative for activity change and unexpected weight change.   HENT: Negative for hearing loss, rhinorrhea and trouble swallowing.    Eyes: Negative for discharge and visual disturbance.   Respiratory: Negative for  chest tightness and wheezing.    Cardiovascular: Negative for chest pain and palpitations.   Gastrointestinal: Negative for blood in stool, constipation, diarrhea and vomiting.   Endocrine: Negative for polydipsia and polyuria.   Genitourinary: Positive for vaginal discharge. Negative for difficulty urinating, dysuria, hematuria and menstrual problem.   Musculoskeletal: Negative for arthralgias, joint swelling and neck pain.   Neurological: Negative for weakness and headaches.   Psychiatric/Behavioral: Negative for confusion and dysphoric mood.       Objective:       LMP 09/11/2020   Physical Exam  Constitutional:       Appearance: Normal appearance. She is well-developed.   HENT:      Head: Normocephalic and atraumatic.   Eyes:      Extraocular Movements: Extraocular movements intact.      Conjunctiva/sclera: Conjunctivae normal.   Neck:      Musculoskeletal: Normal range of motion.   Pulmonary:      Effort: Pulmonary effort is normal.   Musculoskeletal: Normal range of motion.   Neurological:      General: No focal deficit present.      Mental Status: She is alert and oriented to person, place, and time.   Psychiatric:         Mood and Affect: Mood normal.         Assessment:     1. Vaginal candidiasis        Plan:   Vaginal candidiasis  -     fluconazole (DIFLUCAN) 150 MG Tab; Take 1 tablet (150 mg total) by mouth once daily. for 1 day  Dispense: 1 tablet; Refill: 0      Trial of diflucan.     If symptoms worsen or no improvement follow up with PCP/OBGYN.    Side effect profile discussed with pt. Pt stated understanding.   Medication List with Changes/Refills   New Medications    FLUCONAZOLE (DIFLUCAN) 150 MG TAB    Take 1 tablet (150 mg total) by mouth once daily. for 1 day   Current Medications    ALPRAZOLAM (XANAX) 0.25 MG TABLET    Take 1 tablet (0.25 mg total) by mouth 2 (two) times daily. as needed for anxiety.    AMLODIPINE (NORVASC) 10 MG TABLET    TAKE 1 TABLET(10 MG) BY MOUTH EVERY DAY    BUPROPION  (WELLBUTRIN) 75 MG TABLET    Take 1 tablet (75 mg total) by mouth once daily.    CYCLOBENZAPRINE (FLEXERIL) 10 MG TABLET    TAKE 1 TABLET(10 MG) BY MOUTH THREE TIMES DAILY AS NEEDED FOR MUSCLE SPASMS    HYDROXYZINE HCL (ATARAX) 10 MG TAB    Take 1/2 tablet ( 5 mg) to 1 tablet (10 mg) twice a day, as needed for anxiety or panic attacks    SERTRALINE (ZOLOFT) 100 MG TABLET    Take 1 tablet (100 mg total) by mouth once daily.    TRAMADOL (ULTRAM) 50 MG TABLET    Take 1 tablet (50 mg total) by mouth every 6 (six) hours.              Disclaimer: This note may have been prepared using voice recognition software, it may have not been extensively proofed, as such there could be errors within the text such as sound alike errors.

## 2020-10-06 ENCOUNTER — PATIENT MESSAGE (OUTPATIENT)
Dept: ADMINISTRATIVE | Facility: HOSPITAL | Age: 25
End: 2020-10-06

## 2020-12-04 RX ORDER — AMLODIPINE BESYLATE 10 MG/1
10 TABLET ORAL DAILY
Qty: 90 TABLET | Refills: 0 | Status: SHIPPED | OUTPATIENT
Start: 2020-12-04 | End: 2023-05-30

## 2021-01-01 ENCOUNTER — PATIENT MESSAGE (OUTPATIENT)
Dept: INTERNAL MEDICINE | Facility: CLINIC | Age: 26
End: 2021-01-01

## 2021-01-28 ENCOUNTER — PATIENT MESSAGE (OUTPATIENT)
Dept: INTERNAL MEDICINE | Facility: CLINIC | Age: 26
End: 2021-01-28

## 2021-02-02 DIAGNOSIS — F40.298 FEAR OF SIDE EFFECTS OF MEDICATION: ICD-10-CM

## 2021-02-02 RX ORDER — BUPROPION HYDROCHLORIDE 75 MG/1
75 TABLET ORAL DAILY
Qty: 30 TABLET | Refills: 1 | Status: SHIPPED | OUTPATIENT
Start: 2021-02-02 | End: 2021-05-31

## 2021-02-23 ENCOUNTER — PATIENT MESSAGE (OUTPATIENT)
Dept: INTERNAL MEDICINE | Facility: CLINIC | Age: 26
End: 2021-02-23

## 2021-05-11 ENCOUNTER — PATIENT MESSAGE (OUTPATIENT)
Dept: INTERNAL MEDICINE | Facility: CLINIC | Age: 26
End: 2021-05-11

## 2021-05-31 ENCOUNTER — OFFICE VISIT (OUTPATIENT)
Dept: INTERNAL MEDICINE | Facility: CLINIC | Age: 26
End: 2021-05-31
Payer: MEDICAID

## 2021-05-31 ENCOUNTER — LAB VISIT (OUTPATIENT)
Dept: LAB | Facility: HOSPITAL | Age: 26
End: 2021-05-31
Attending: FAMILY MEDICINE
Payer: MEDICAID

## 2021-05-31 ENCOUNTER — PATIENT MESSAGE (OUTPATIENT)
Dept: INTERNAL MEDICINE | Facility: CLINIC | Age: 26
End: 2021-05-31

## 2021-05-31 VITALS
OXYGEN SATURATION: 98 % | HEIGHT: 62 IN | SYSTOLIC BLOOD PRESSURE: 118 MMHG | TEMPERATURE: 98 F | DIASTOLIC BLOOD PRESSURE: 68 MMHG | BODY MASS INDEX: 23.29 KG/M2 | WEIGHT: 126.56 LBS | HEART RATE: 93 BPM | RESPIRATION RATE: 18 BRPM

## 2021-05-31 DIAGNOSIS — D57.3 SICKLE CELL TRAIT: ICD-10-CM

## 2021-05-31 DIAGNOSIS — Z00.00 ROUTINE PHYSICAL EXAMINATION: ICD-10-CM

## 2021-05-31 DIAGNOSIS — Z23 NEED FOR VACCINATION AGAINST STREPTOCOCCUS PNEUMONIAE: ICD-10-CM

## 2021-05-31 DIAGNOSIS — F41.9 ANXIETY AND DEPRESSION: ICD-10-CM

## 2021-05-31 DIAGNOSIS — F32.A ANXIETY AND DEPRESSION: ICD-10-CM

## 2021-05-31 DIAGNOSIS — Z00.00 ROUTINE PHYSICAL EXAMINATION: Primary | ICD-10-CM

## 2021-05-31 LAB
ALBUMIN SERPL BCP-MCNC: 4 G/DL (ref 3.5–5.2)
ALP SERPL-CCNC: 70 U/L (ref 55–135)
ALT SERPL W/O P-5'-P-CCNC: 10 U/L (ref 10–44)
ANION GAP SERPL CALC-SCNC: 8 MMOL/L (ref 8–16)
AST SERPL-CCNC: 18 U/L (ref 10–40)
BASOPHILS # BLD AUTO: 0.03 K/UL (ref 0–0.2)
BASOPHILS NFR BLD: 0.6 % (ref 0–1.9)
BILIRUB SERPL-MCNC: 0.4 MG/DL (ref 0.1–1)
BUN SERPL-MCNC: 6 MG/DL (ref 6–20)
CALCIUM SERPL-MCNC: 9.5 MG/DL (ref 8.7–10.5)
CHLORIDE SERPL-SCNC: 107 MMOL/L (ref 95–110)
CHOLEST SERPL-MCNC: 183 MG/DL (ref 120–199)
CHOLEST/HDLC SERPL: 2.6 {RATIO} (ref 2–5)
CO2 SERPL-SCNC: 23 MMOL/L (ref 23–29)
CREAT SERPL-MCNC: 0.8 MG/DL (ref 0.5–1.4)
DIFFERENTIAL METHOD: ABNORMAL
EOSINOPHIL # BLD AUTO: 0.1 K/UL (ref 0–0.5)
EOSINOPHIL NFR BLD: 1 % (ref 0–8)
ERYTHROCYTE [DISTWIDTH] IN BLOOD BY AUTOMATED COUNT: 17 % (ref 11.5–14.5)
EST. GFR  (AFRICAN AMERICAN): >60 ML/MIN/1.73 M^2
EST. GFR  (NON AFRICAN AMERICAN): >60 ML/MIN/1.73 M^2
GLUCOSE SERPL-MCNC: 95 MG/DL (ref 70–110)
HCT VFR BLD AUTO: 38.2 % (ref 37–48.5)
HDLC SERPL-MCNC: 70 MG/DL (ref 40–75)
HDLC SERPL: 38.3 % (ref 20–50)
HGB BLD-MCNC: 12.4 G/DL (ref 12–16)
IMM GRANULOCYTES # BLD AUTO: 0.01 K/UL (ref 0–0.04)
IMM GRANULOCYTES NFR BLD AUTO: 0.2 % (ref 0–0.5)
LDLC SERPL CALC-MCNC: 103.4 MG/DL (ref 63–159)
LYMPHOCYTES # BLD AUTO: 1.5 K/UL (ref 1–4.8)
LYMPHOCYTES NFR BLD: 30 % (ref 18–48)
MCH RBC QN AUTO: 25.6 PG (ref 27–31)
MCHC RBC AUTO-ENTMCNC: 32.5 G/DL (ref 32–36)
MCV RBC AUTO: 79 FL (ref 82–98)
MONOCYTES # BLD AUTO: 0.3 K/UL (ref 0.3–1)
MONOCYTES NFR BLD: 6.3 % (ref 4–15)
NEUTROPHILS # BLD AUTO: 3.1 K/UL (ref 1.8–7.7)
NEUTROPHILS NFR BLD: 61.9 % (ref 38–73)
NONHDLC SERPL-MCNC: 113 MG/DL
NRBC BLD-RTO: 0 /100 WBC
PLATELET # BLD AUTO: 420 K/UL (ref 150–450)
PMV BLD AUTO: 10.1 FL (ref 9.2–12.9)
POTASSIUM SERPL-SCNC: 3.6 MMOL/L (ref 3.5–5.1)
PROT SERPL-MCNC: 7.7 G/DL (ref 6–8.4)
RBC # BLD AUTO: 4.85 M/UL (ref 4–5.4)
SODIUM SERPL-SCNC: 138 MMOL/L (ref 136–145)
TRIGL SERPL-MCNC: 48 MG/DL (ref 30–150)
WBC # BLD AUTO: 5.07 K/UL (ref 3.9–12.7)

## 2021-05-31 PROCEDURE — 99999 PR PBB SHADOW E&M-EST. PATIENT-LVL IV: CPT | Mod: PBBFAC,,, | Performed by: FAMILY MEDICINE

## 2021-05-31 PROCEDURE — 80053 COMPREHEN METABOLIC PANEL: CPT | Performed by: FAMILY MEDICINE

## 2021-05-31 PROCEDURE — 99395 PREV VISIT EST AGE 18-39: CPT | Mod: S$PBB,,, | Performed by: FAMILY MEDICINE

## 2021-05-31 PROCEDURE — 80061 LIPID PANEL: CPT | Performed by: FAMILY MEDICINE

## 2021-05-31 PROCEDURE — 90471 IMMUNIZATION ADMIN: CPT | Mod: PBBFAC

## 2021-05-31 PROCEDURE — 99395 PR PREVENTIVE VISIT,EST,18-39: ICD-10-PCS | Mod: S$PBB,,, | Performed by: FAMILY MEDICINE

## 2021-05-31 PROCEDURE — 36415 COLL VENOUS BLD VENIPUNCTURE: CPT | Performed by: FAMILY MEDICINE

## 2021-05-31 PROCEDURE — 99214 OFFICE O/P EST MOD 30 MIN: CPT | Mod: PBBFAC,25 | Performed by: FAMILY MEDICINE

## 2021-05-31 PROCEDURE — 85025 COMPLETE CBC W/AUTO DIFF WBC: CPT | Performed by: FAMILY MEDICINE

## 2021-05-31 PROCEDURE — 99999 PR PBB SHADOW E&M-EST. PATIENT-LVL IV: ICD-10-PCS | Mod: PBBFAC,,, | Performed by: FAMILY MEDICINE

## 2021-05-31 RX ORDER — ALPRAZOLAM 0.25 MG/1
0.25 TABLET ORAL 2 TIMES DAILY
Qty: 24 TABLET | Refills: 0 | Status: SHIPPED | OUTPATIENT
Start: 2021-05-31 | End: 2021-08-31 | Stop reason: SDUPTHER

## 2021-06-02 ENCOUNTER — PATIENT OUTREACH (OUTPATIENT)
Dept: ADMINISTRATIVE | Facility: HOSPITAL | Age: 26
End: 2021-06-02

## 2021-06-02 ENCOUNTER — PATIENT MESSAGE (OUTPATIENT)
Dept: INTERNAL MEDICINE | Facility: CLINIC | Age: 26
End: 2021-06-02

## 2021-06-04 ENCOUNTER — PATIENT MESSAGE (OUTPATIENT)
Dept: INTERNAL MEDICINE | Facility: CLINIC | Age: 26
End: 2021-06-04

## 2021-07-11 ENCOUNTER — PATIENT MESSAGE (OUTPATIENT)
Dept: INTERNAL MEDICINE | Facility: CLINIC | Age: 26
End: 2021-07-11

## 2021-07-21 ENCOUNTER — TELEPHONE (OUTPATIENT)
Dept: GASTROENTEROLOGY | Facility: CLINIC | Age: 26
End: 2021-07-21

## 2021-07-21 ENCOUNTER — TELEPHONE (OUTPATIENT)
Dept: INTERNAL MEDICINE | Facility: CLINIC | Age: 26
End: 2021-07-21

## 2021-07-21 ENCOUNTER — PATIENT MESSAGE (OUTPATIENT)
Dept: INTERNAL MEDICINE | Facility: CLINIC | Age: 26
End: 2021-07-21

## 2021-07-21 DIAGNOSIS — K58.1 IRRITABLE BOWEL SYNDROME WITH CONSTIPATION: Primary | ICD-10-CM

## 2021-07-27 ENCOUNTER — PATIENT MESSAGE (OUTPATIENT)
Dept: GASTROENTEROLOGY | Facility: CLINIC | Age: 26
End: 2021-07-27

## 2021-08-31 ENCOUNTER — PATIENT MESSAGE (OUTPATIENT)
Dept: INTERNAL MEDICINE | Facility: CLINIC | Age: 26
End: 2021-08-31

## 2021-08-31 DIAGNOSIS — F41.9 ANXIETY AND DEPRESSION: ICD-10-CM

## 2021-08-31 DIAGNOSIS — M54.2 NECK PAIN: ICD-10-CM

## 2021-08-31 DIAGNOSIS — D57.3 SICKLE CELL TRAIT: ICD-10-CM

## 2021-08-31 DIAGNOSIS — F32.A ANXIETY AND DEPRESSION: ICD-10-CM

## 2021-08-31 DIAGNOSIS — M54.50 LOW BACK PAIN AT MULTIPLE SITES: ICD-10-CM

## 2021-08-31 RX ORDER — ALPRAZOLAM 0.25 MG/1
0.25 TABLET ORAL 2 TIMES DAILY
Qty: 24 TABLET | Refills: 0 | Status: SHIPPED | OUTPATIENT
Start: 2021-08-31 | End: 2021-10-29 | Stop reason: SDUPTHER

## 2021-08-31 RX ORDER — CYCLOBENZAPRINE HCL 10 MG
TABLET ORAL
Qty: 30 TABLET | Refills: 1 | Status: SHIPPED | OUTPATIENT
Start: 2021-08-31 | End: 2021-10-29 | Stop reason: SDUPTHER

## 2021-08-31 RX ORDER — TRAMADOL HYDROCHLORIDE 50 MG/1
50 TABLET ORAL EVERY 6 HOURS
Qty: 28 TABLET | Refills: 0 | Status: SHIPPED | OUTPATIENT
Start: 2021-08-31 | End: 2022-07-18 | Stop reason: SDUPTHER

## 2021-09-13 ENCOUNTER — PATIENT MESSAGE (OUTPATIENT)
Dept: INTERNAL MEDICINE | Facility: CLINIC | Age: 26
End: 2021-09-13

## 2021-09-16 ENCOUNTER — PATIENT MESSAGE (OUTPATIENT)
Dept: INTERNAL MEDICINE | Facility: CLINIC | Age: 26
End: 2021-09-16

## 2021-09-16 ENCOUNTER — OFFICE VISIT (OUTPATIENT)
Dept: PRIMARY CARE CLINIC | Facility: CLINIC | Age: 26
End: 2021-09-16
Payer: MEDICAID

## 2021-09-16 DIAGNOSIS — B37.49 CANDIDA INFECTION OF GENITAL REGION: ICD-10-CM

## 2021-09-16 DIAGNOSIS — N39.0 URINARY TRACT INFECTION WITHOUT HEMATURIA, SITE UNSPECIFIED: Primary | ICD-10-CM

## 2021-09-16 PROCEDURE — 99213 OFFICE O/P EST LOW 20 MIN: CPT | Mod: 95,,, | Performed by: NURSE PRACTITIONER

## 2021-09-16 PROCEDURE — 99213 PR OFFICE/OUTPT VISIT, EST, LEVL III, 20-29 MIN: ICD-10-PCS | Mod: 95,,, | Performed by: NURSE PRACTITIONER

## 2021-09-16 RX ORDER — NITROFURANTOIN 25; 75 MG/1; MG/1
100 CAPSULE ORAL 2 TIMES DAILY
Qty: 10 CAPSULE | Refills: 0 | Status: SHIPPED | OUTPATIENT
Start: 2021-09-16 | End: 2021-09-21

## 2021-09-16 RX ORDER — FLUCONAZOLE 150 MG/1
150 TABLET ORAL DAILY
Qty: 1 TABLET | Refills: 0 | Status: SHIPPED | OUTPATIENT
Start: 2021-09-16 | End: 2021-09-17

## 2021-10-28 ENCOUNTER — PATIENT MESSAGE (OUTPATIENT)
Dept: INTERNAL MEDICINE | Facility: CLINIC | Age: 26
End: 2021-10-28
Payer: MEDICAID

## 2021-10-29 DIAGNOSIS — M54.2 NECK PAIN: ICD-10-CM

## 2021-10-29 DIAGNOSIS — F41.9 ANXIETY AND DEPRESSION: ICD-10-CM

## 2021-10-29 DIAGNOSIS — M54.50 LOW BACK PAIN AT MULTIPLE SITES: ICD-10-CM

## 2021-10-29 DIAGNOSIS — D57.3 SICKLE CELL TRAIT: ICD-10-CM

## 2021-10-29 DIAGNOSIS — F32.A ANXIETY AND DEPRESSION: ICD-10-CM

## 2021-10-29 RX ORDER — CYCLOBENZAPRINE HCL 10 MG
TABLET ORAL
Qty: 30 TABLET | Refills: 1 | Status: SHIPPED | OUTPATIENT
Start: 2021-10-29 | End: 2022-07-18 | Stop reason: SDUPTHER

## 2021-10-29 RX ORDER — ALPRAZOLAM 0.25 MG/1
0.25 TABLET ORAL 2 TIMES DAILY
Qty: 24 TABLET | Refills: 0 | Status: SHIPPED | OUTPATIENT
Start: 2021-10-29 | End: 2022-02-09

## 2022-02-09 ENCOUNTER — PATIENT MESSAGE (OUTPATIENT)
Dept: INTERNAL MEDICINE | Facility: CLINIC | Age: 27
End: 2022-02-09

## 2022-02-09 ENCOUNTER — OFFICE VISIT (OUTPATIENT)
Dept: INTERNAL MEDICINE | Facility: CLINIC | Age: 27
End: 2022-02-09
Payer: MEDICAID

## 2022-02-09 DIAGNOSIS — Z86.2 HISTORY OF SICKLE CELL TRAIT: ICD-10-CM

## 2022-02-09 DIAGNOSIS — F41.1 GENERALIZED ANXIETY DISORDER WITH PANIC ATTACKS: Primary | ICD-10-CM

## 2022-02-09 DIAGNOSIS — F41.0 GENERALIZED ANXIETY DISORDER WITH PANIC ATTACKS: Primary | ICD-10-CM

## 2022-02-09 PROCEDURE — 1160F PR REVIEW ALL MEDS BY PRESCRIBER/CLIN PHARMACIST DOCUMENTED: ICD-10-PCS | Mod: CPTII,95,, | Performed by: PHYSICIAN ASSISTANT

## 2022-02-09 PROCEDURE — 99213 PR OFFICE/OUTPT VISIT, EST, LEVL III, 20-29 MIN: ICD-10-PCS | Mod: 95,,, | Performed by: PHYSICIAN ASSISTANT

## 2022-02-09 PROCEDURE — 1159F MED LIST DOCD IN RCRD: CPT | Mod: CPTII,95,, | Performed by: PHYSICIAN ASSISTANT

## 2022-02-09 PROCEDURE — 99213 OFFICE O/P EST LOW 20 MIN: CPT | Mod: 95,,, | Performed by: PHYSICIAN ASSISTANT

## 2022-02-09 PROCEDURE — 1159F PR MEDICATION LIST DOCUMENTED IN MEDICAL RECORD: ICD-10-PCS | Mod: CPTII,95,, | Performed by: PHYSICIAN ASSISTANT

## 2022-02-09 PROCEDURE — 1160F RVW MEDS BY RX/DR IN RCRD: CPT | Mod: CPTII,95,, | Performed by: PHYSICIAN ASSISTANT

## 2022-02-09 RX ORDER — ALPRAZOLAM 0.5 MG/1
0.5 TABLET ORAL 2 TIMES DAILY PRN
Qty: 24 TABLET | Refills: 0 | Status: SHIPPED | OUTPATIENT
Start: 2022-02-09 | End: 2022-12-29 | Stop reason: SDUPTHER

## 2022-02-09 NOTE — PROGRESS NOTES
Subjective:       Patient ID: Christopher Webber is a 26 y.o. female.    Chief Complaint: Medication Refill (Xanax)      The patient location is: home  The chief complaint leading to consultation is: medication refill     Visit type: audiovisual    Face to Face time with patient: 6 minutes (chart review started 406; video started 406; video ended 412)  8 minutes of total time spent on the encounter, which includes face to face time and non-face to face time preparing to see the patient (eg, review of tests), Obtaining and/or reviewing separately obtained history, Documenting clinical information in the electronic or other health record, Independently interpreting results (not separately reported) and communicating results to the patient/family/caregiver, or Care coordination (not separately reported).     Each patient to whom he or she provides medical services by telemedicine is:  (1) informed of the relationship between the physician and patient and the respective role of any other health care provider with respect to management of the patient; and (2) notified that he or she may decline to receive medical services by telemedicine and may withdraw from such care at any time.    Patient Care Team:  Chantel Bowen MD as PCP - General (Internal Medicine)  Rose Shaw LPN as Care Coordinator (Internal Medicine)  Jordyn Lan MD (Obstetrics and Gynecology)    Mrs. Webber request medication refill of Xanax. She is currently taking .25mg as needed and #24 lasts a few months. She reports that lately she finds herself taking 2 at a time because 1 will not produce the desired effect. She is also requesting refill of tramadol for body pain secondary to sickle cell trait. She denies ever seeing Hematologist. I explained to patient that I was unable to refill tramadol but would refill her Xanax and increase her dose. I also discussed with her that if she starts taking Xanax on a daily basis then we need to refer her to  psych for evaluation. She was agreeable.     Review of Systems   Constitutional: Negative for activity change and unexpected weight change.   HENT: Negative for hearing loss, rhinorrhea and trouble swallowing.    Eyes: Negative for discharge and visual disturbance.   Respiratory: Negative for chest tightness and wheezing.    Cardiovascular: Negative for chest pain and palpitations.   Gastrointestinal: Negative for blood in stool, constipation, diarrhea and vomiting.   Endocrine: Negative for polydipsia and polyuria.   Genitourinary: Negative for difficulty urinating, dysuria, hematuria and menstrual problem.   Musculoskeletal: Negative for arthralgias, joint swelling and neck pain.   Neurological: Negative for weakness and headaches.   Psychiatric/Behavioral: Negative for confusion and dysphoric mood.         Objective:        Wt Readings from Last 3 Encounters:   05/31/21 57.4 kg (126 lb 8.7 oz)   09/18/20 52.7 kg (116 lb 1.2 oz)   02/13/20 54.4 kg (119 lb 14.9 oz)     Temp Readings from Last 3 Encounters:   05/31/21 97.6 °F (36.4 °C) (Tympanic)   12/27/19 98 °F (36.7 °C) (Tympanic)   09/10/19 98.2 °F (36.8 °C) (Tympanic)     BP Readings from Last 3 Encounters:   05/31/21 118/68   09/18/20 122/84   07/24/20 126/76     Pulse Readings from Last 3 Encounters:   05/31/21 93   12/27/19 70   09/10/19 92     There is no height or weight on file to calculate BMI.    Physical Exam  Vitals and nursing note reviewed.   Constitutional:       General: She is not in acute distress.     Appearance: She is well-developed.   HENT:      Head: Normocephalic and atraumatic.   Eyes:      General: Lids are normal. No scleral icterus.     Extraocular Movements: Extraocular movements intact.      Conjunctiva/sclera: Conjunctivae normal.   Pulmonary:      Effort: Pulmonary effort is normal.   Neurological:      Mental Status: She is alert.   Psychiatric:         Mood and Affect: Mood and affect normal.         Assessment:       1.  Generalized anxiety disorder with panic attacks    2. History of sickle cell trait        Plan:     Problem List Items Addressed This Visit        Other    Generalized anxiety disorder with panic attacks - Primary    Relevant Medications    ALPRAZolam (XANAX) 0.5 MG tablet      Other Visit Diagnoses     History of sickle cell trait        Relevant Orders    Ambulatory referral/consult to Hematology / Oncology          Answers for HPI/ROS submitted by the patient on 2/9/2022  activity change: No  unexpected weight change: No  neck pain: No  hearing loss: No  rhinorrhea: No  trouble swallowing: No  eye discharge: No  visual disturbance: No  chest tightness: No  wheezing: No  chest pain: No  palpitations: No  blood in stool: No  constipation: No  vomiting: No  diarrhea: No  polydipsia: No  polyuria: No  difficulty urinating: No  hematuria: No  menstrual problem: No  dysuria: No  joint swelling: No  arthralgias: No  headaches: No  weakness: No  confusion: No  dysphoric mood: No

## 2022-02-09 NOTE — Clinical Note
Also refer to hematology however I believe they may make their own appointments because of her insurance

## 2022-02-09 NOTE — PATIENT INSTRUCTIONS
1. Drink plenty of fluids  2. Get plenty of rest.  3. Take Tylenol as directed on package for pain/fever. Do not take more than package suggests to take.   4. Go to Emergency Department if your symptoms worsen.  5. Follow up with your PCP in 1 week if symptoms persist.   Patient Education       Anxiety Discharge Instructions, Adult   About this topic   Anxiety can cause you to feel very worried. It can also cause physical symptoms like chest pain, stomach aches, or trouble sleeping. While mild anxiety is a normal response to stress, it can cause you problems in your everyday life. You may need follow-up care to help manage your anxiety. Anxiety happens in many forms, like:  · Being scared all the time that something bad is going to happen. This is generalized anxiety.  · Strong bursts of fear where your body has signs that may feel like a heart attack. This is called a panic attack.  · Upsetting thoughts that happen often. There is a need to repeat doing certain things to help get rid of the anxiety caused by these thoughts. The thoughts or actions may be about checking on things, touching things, or worry about germs. This is an obsessive-compulsive disorder.  · Strong fear of an object, place, or condition. This is a phobia.  · Fear that others think bad things about you or being put down by other people. This is social anxiety.  · Nightmares, flashbacks, staying away from people, or having panic attacks when reminded of a shocking or hurtful time or place from the past. This is post-traumatic stress.  Anxiety disorder may be treated in many ways. Some kinds of treatment have you talk about your beliefs, fears, and worries. You may learn how certain thoughts or feelings can raise anxiety. You may also learn what steps to take to lower anxiety. Other kinds of treatment may have you look back on a hurtful event, sad memory, or something you are afraid of. The doctor will help you deal with the feelings that you may  have. You may learn ways to cope with unwanted events or thoughts by looking at your fears in a way that feels safe.  What care is needed at home?   · Ask your doctor what you need to do when you go home. Make sure you ask questions if you do not understand what the doctor says.  · Set a time to talk with a counselor about your worries and feelings. This can help you with your anxiety.  · Take care to follow all instructions when you take your medicines.  · Limit alcohol and caffeine.  · Learn ways to manage stress. Relaxation methods like reflection, deep breathing, and muscle relaxation may be helpful. Things like yoga, exercise, and jarocho chi are also good.  · Talk about your feelings with family members and friends you trust. Talk to someone who can help you see how your thoughts at certain times may raise your anxiety.     What follow-up care is needed?   Your doctor may ask you to make visits to the office to check on your progress. Be sure to keep these visits.  What drugs may be needed?   The doctor may order drugs to help the physical signs of anxiety. Make sure that you take the drugs as taught to you by the doctor. Talk with your doctor about any side effects and ask how long you should take the drug.  Will physical activity be limited?   You may take part in physical activities. Some people are limited because of their anxiety or fear. Talk with your doctor about the right amount of activity for you.  What changes to diet are needed?   Eat a variety of healthy foods and limit drinks with caffeine. You should avoid alcohol, energy drinks, and over-the-counter stimulants.  What problems could happen?   If your anxiety is not treated, it can result in:  · Staying away from work or social events  · Not being able to do everyday tasks  · Keeping away from family and friends  What can be done to prevent this health problem?   · Learn what events, people, or things upset you. Limit your contact with them.  · Talk  about your feelings. Talk to someone who can help you see how your thoughts at certain times may raise your anxiety.  · Seek support from your friends and family. Find someone who calms you down. Ask if you can call them when you are getting anxious.  When do I need to call the doctor?   · You feel you may harm yourself or someone else.  · You can also call a mental health hotline for help.  · You have any physical symptoms, such as chest pain, trouble breathing, or severe belly pain, that could be a sign of a serious problem.  · If you are short of breath.  · If you do not feel like you can be alone.  Teach Back: Helping You Understand   The Teach Back Method helps you understand the information we are giving you. After you talk with the staff, tell them in your own words what you learned. This helps to make sure the staff has described each thing clearly. It also helps to explain things that may have been confusing. Before going home, make sure you can do these:  · I can tell you about my condition and the drugs I need to take.  · I can tell you what may help lower my anxiety.  · I can tell you what I will do if it is hard to breathe or I have chest pain.  · I can tell you what I will do if I do not feel safe or cannot be alone.  Where can I learn more?   [facility]  https://www.[facility].org/Learn-More/Mental-Health-Conditions/Anxiety-Disorders   [facility]  https://www.nhs.uk/conditions/generalised-anxiety-disorder/symptoms/   [employer] [place] [facility] ? [facility]  https://www.lindsay.nih.gov/health/relieving-stress-anxiety-frmtlnjdx-gngpvcgnjc-rocnzqshwh   [employer] [place] [employer]  http://www.nimh.nih.gov/health/publications/anxiety-disorders/complete-index.shtml   Last Reviewed Date   2021-06-08  Consumer Information Use and Disclaimer   This information is not specific medical advice and does not replace information you receive from your health care provider. This is only a brief summary of  general information. It does NOT include all information about conditions, illnesses, injuries, tests, procedures, treatments, therapies, discharge instructions or life-style choices that may apply to you. You must talk with your health care provider for complete information about your health and treatment options. This information should not be used to decide whether or not to accept your health care providers advice, instructions or recommendations. Only your health care provider has the knowledge and training to provide advice that is right for you.  Copyright   Copyright © 2021 [employer] and its affiliates and/or licensors. All rights reserved.

## 2022-02-10 ENCOUNTER — TELEPHONE (OUTPATIENT)
Dept: INTERNAL MEDICINE | Facility: CLINIC | Age: 27
End: 2022-02-10
Payer: MEDICAID

## 2022-02-10 NOTE — TELEPHONE ENCOUNTER
----- Message from Lisa Penn PA-C sent at 2/9/2022  4:18 PM CST -----  Also refer to hematology however I believe they may make their own appointments because of her insurance

## 2022-03-15 ENCOUNTER — PATIENT MESSAGE (OUTPATIENT)
Dept: HEMATOLOGY/ONCOLOGY | Facility: CLINIC | Age: 27
End: 2022-03-15
Payer: MEDICAID

## 2022-03-24 ENCOUNTER — PATIENT MESSAGE (OUTPATIENT)
Dept: HEMATOLOGY/ONCOLOGY | Facility: CLINIC | Age: 27
End: 2022-03-24

## 2022-03-24 ENCOUNTER — OFFICE VISIT (OUTPATIENT)
Dept: HEMATOLOGY/ONCOLOGY | Facility: CLINIC | Age: 27
End: 2022-03-24
Payer: MEDICAID

## 2022-03-24 DIAGNOSIS — M25.50 ARTHRALGIA, UNSPECIFIED JOINT: ICD-10-CM

## 2022-03-24 DIAGNOSIS — D57.3 SICKLE CELL TRAIT: Primary | ICD-10-CM

## 2022-03-24 DIAGNOSIS — R71.8 MICROCYTOSIS: ICD-10-CM

## 2022-03-24 PROCEDURE — 1159F PR MEDICATION LIST DOCUMENTED IN MEDICAL RECORD: ICD-10-PCS | Mod: CPTII,95,, | Performed by: STUDENT IN AN ORGANIZED HEALTH CARE EDUCATION/TRAINING PROGRAM

## 2022-03-24 PROCEDURE — 99203 OFFICE O/P NEW LOW 30 MIN: CPT | Mod: 95,,, | Performed by: STUDENT IN AN ORGANIZED HEALTH CARE EDUCATION/TRAINING PROGRAM

## 2022-03-24 PROCEDURE — 1160F RVW MEDS BY RX/DR IN RCRD: CPT | Mod: CPTII,95,, | Performed by: STUDENT IN AN ORGANIZED HEALTH CARE EDUCATION/TRAINING PROGRAM

## 2022-03-24 PROCEDURE — 99203 PR OFFICE/OUTPT VISIT, NEW, LEVL III, 30-44 MIN: ICD-10-PCS | Mod: 95,,, | Performed by: STUDENT IN AN ORGANIZED HEALTH CARE EDUCATION/TRAINING PROGRAM

## 2022-03-24 PROCEDURE — 1160F PR REVIEW ALL MEDS BY PRESCRIBER/CLIN PHARMACIST DOCUMENTED: ICD-10-PCS | Mod: CPTII,95,, | Performed by: STUDENT IN AN ORGANIZED HEALTH CARE EDUCATION/TRAINING PROGRAM

## 2022-03-24 PROCEDURE — 1159F MED LIST DOCD IN RCRD: CPT | Mod: CPTII,95,, | Performed by: STUDENT IN AN ORGANIZED HEALTH CARE EDUCATION/TRAINING PROGRAM

## 2022-03-24 NOTE — PROGRESS NOTES
The patient location is: [place]  The chief complaint leading to consultation is: see chief complaint below    Visit type: audiovisual    Face to Face time with patient: 15   30minutes of total time spent on the encounter, which includes face to face time and non-face to face time preparing to see the patient (eg, review of tests), Obtaining and/or reviewing separately obtained history, Documenting clinical information in the electronic or other health record, Independently interpreting results (not separately reported) and communicating results to the patient/family/caregiver, or Care coordination (not separately reported).         Each patient to whom he or she provides medical services by telemedicine is:  (1) informed of the relationship between the physician and patient and the respective role of any other health care provider with respect to management of the patient; and (2) notified that he or she may decline to receive medical services by telemedicine and may withdraw from such care at any time.    Hematology- Oncology Clinic Note :      3/24/2022    RFV / chief complaint- Referral (Sickle cell trait)        HPI  Pt is a 26 y.o. female who  has a past medical history of Anxiety state (2018 2:22:17 PM), Anxiety state (2018 2:22:17 PM), Depression, major, recurrent, mild (2020), Essential hypertension (9/10/2019), Essential hypertension (2018 2:24:01 PM), Essential hypertension (2018 2:24:01 PM), History of Narcolepsy, Spontaneous  (2019 3:15:45 PM), and Spontaneous  (2019 3:15:45 PM).   Pt presents to the clinic today for sickle cell trait      Pt complains of moderate intensity 6-7/10 pain all over her body jenae her Upper body (incl back, shoulders, stomach)  going on for years since high school relieved with pain medication and sleep. Pain is intermittent. She takes OTC Tylenol pm , aleve etc. Tramadol prescribed by pcp.     She was told that sometimes  sickle cell trait can also cause pain like sickle cell disease and hence she wanted to discuss it with hematologist.       Reviewed past medical/surgical/social history    Past Medical History:   Diagnosis Date    Anxiety state 2018 2:22:17 PM    [facility] Historical - Quick Add: Anxiety and depression-No Additional Notes    Anxiety state 2018 2:22:17 PM    [facility] Historical - Quick Add: Anxiety and depression-No Additional Notes    Depression, major, recurrent, mild 2020    Essential hypertension 9/10/2019    Essential hypertension 2018 2:24:01 PM    [facility] Historical - Cardiovascular: Hypertension-No Additional Notes    Essential hypertension 2018 2:24:01 PM    [facility] Historical - Cardiovascular: Hypertension-No Additional Notes    History of Narcolepsy     10 years ago    Spontaneous  2019 3:15:45 PM    [facility] Historical - Unknown: Miscarriage-No Additional Notes    Spontaneous  2019 3:15:45 PM    [facility] Historical - Unknown: Miscarriage-No Additional Notes      Past Surgical History:   Procedure Laterality Date     SECTION      TONSILLECTOMY        Review of patient's allergies indicates:   Allergen Reactions    Asa [aspirin] Other (See Comments)     IBS       Social History     Tobacco Use    Smoking status: Never Smoker    Smokeless tobacco: Never Used   Substance Use Topics    Alcohol use: Not Currently     Alcohol/week: 0.0 standard drinks     Comment: social      Family History   Problem Relation Age of Onset    Breast cancer Neg Hx     Cancer Neg Hx     Colon cancer Neg Hx     Diabetes Neg Hx     Eclampsia Neg Hx     Hypertension Neg Hx     Miscarriages / Stillbirths Neg Hx      labor Neg Hx     Ovarian cancer Neg Hx     Stroke Neg Hx           Review of Systems :  Review of Systems   Constitutional: Negative for chills, diaphoresis, fever, malaise/fatigue and weight loss.   HENT:  Negative.  Negative for congestion, hearing loss, nosebleeds, sore throat and tinnitus.    Eyes: Negative.  Negative for blurred vision and discharge.   Respiratory: Negative for cough, hemoptysis, sputum production, shortness of breath and wheezing.    Cardiovascular: Negative.  Negative for chest pain, palpitations and leg swelling.   Gastrointestinal: Negative.  Negative for abdominal pain, blood in stool, constipation, diarrhea, heartburn, melena, nausea and vomiting.   Genitourinary: Negative.    Musculoskeletal: Positive for back pain, joint pain and myalgias. Negative for falls.   Skin: Negative.  Negative for itching and rash.   Neurological: Negative.  Negative for dizziness, tingling, sensory change, speech change, focal weakness, seizures, loss of consciousness, weakness and headaches.   Endo/Heme/Allergies: Negative.  Does not bruise/bleed easily.   Psychiatric/Behavioral: Negative.  Negative for depression. The patient is not nervous/anxious and does not have insomnia.                Physical Exam :  There were no vitals taken for this visit.  Wt Readings from Last 3 Encounters:   05/31/21 57.4 kg (126 lb 8.7 oz)   09/18/20 52.7 kg (116 lb 1.2 oz)   02/13/20 54.4 kg (119 lb 14.9 oz)       There is no height or weight on file to calculate BMI.      Physical Exam  Constitutional:       General: She is not in acute distress.     Appearance: Normal appearance. She is not ill-appearing.   HENT:      Head: Normocephalic and atraumatic.      Right Ear: External ear normal.      Left Ear: External ear normal.   Eyes:      General: No scleral icterus.        Right eye: No discharge.         Left eye: No discharge.   Pulmonary:      Effort: Pulmonary effort is normal. No respiratory distress.   Skin:     Coloration: Skin is not jaundiced.      Findings: No erythema or rash.   Neurological:      Mental Status: She is alert and oriented to person, place, and time. Mental status is at baseline.   Psychiatric:          Mood and Affect: Mood normal.         Speech: Speech normal.         Behavior: Behavior normal.             Current Outpatient Medications   Medication Sig Dispense Refill    ALPRAZolam (XANAX) 0.5 MG tablet Take 1 tablet (0.5 mg total) by mouth 2 (two) times daily as needed for Anxiety. 24 tablet 0    amLODIPine (NORVASC) 10 MG tablet Take 1 tablet (10 mg total) by mouth once daily. 90 tablet 0    cyclobenzaprine (FLEXERIL) 10 MG tablet TAKE 1 TABLET(10 MG) BY MOUTH THREE TIMES DAILY AS NEEDED FOR MUSCLE SPASMS 30 tablet 1    traMADoL (ULTRAM) 50 mg tablet Take 1 tablet (50 mg total) by mouth every 6 (six) hours. 28 tablet 0     No current facility-administered medications for this visit.       Pertinent Diagnostic studies:      No visits with results within 1 Week(s) from this visit.   Latest known visit with results is:   Lab Visit on 05/31/2021   Component Date Value Ref Range Status    Cholesterol 05/31/2021 183  120 - 199 mg/dL Final    Comment: The National Cholesterol Education Program (NCEP) has set the  following guidelines (reference ranges) for Cholesterol:  Optimal.....................<200 mg/dL  Borderline High.............200-239 mg/dL  High........................> or = 240 mg/dL      Triglycerides 05/31/2021 48  30 - 150 mg/dL Final    Comment: The National Cholesterol Education Program (NCEP) has set the  following guidelines (reference values) for triglycerides:  Normal......................<150 mg/dL  Borderline High.............150-199 mg/dL  High........................200-499 mg/dL      HDL 05/31/2021 70  40 - 75 mg/dL Final    Comment: The National Cholesterol Education Program (NCEP) has set the  following guidelines (reference values) for HDL Cholesterol:  Low...............<40 mg/dL  Optimal...........>60 mg/dL      LDL Cholesterol 05/31/2021 103.4  63.0 - 159.0 mg/dL Final    Comment: The National Cholesterol Education Program (NCEP) has set the  following guidelines (reference  values) for LDL Cholesterol:  Optimal.......................<130 mg/dL  Borderline High...............130-159 mg/dL  High..........................160-189 mg/dL  Very High.....................>190 mg/dL      HDL/Cholesterol Ratio 05/31/2021 38.3  20.0 - 50.0 % Final    Total Cholesterol/HDL Ratio 05/31/2021 2.6  2.0 - 5.0 Final    Non-HDL Cholesterol 05/31/2021 113  mg/dL Final    Comment: Risk category and Non-HDL cholesterol goals:  Coronary heart disease (CHD)or equivalent (10-year risk of CHD >20%):  Non-HDL cholesterol goal     <130 mg/dL  Two or more CHD risk factors and 10-year risk of CHD <= 20%:  Non-HDL cholesterol goal     <160 mg/dL  0 to 1 CHD risk factor:  Non-HDL cholesterol goal     <190 mg/dL      Sodium 05/31/2021 138  136 - 145 mmol/L Final    Potassium 05/31/2021 3.6  3.5 - 5.1 mmol/L Final    Chloride 05/31/2021 107  95 - 110 mmol/L Final    CO2 05/31/2021 23  23 - 29 mmol/L Final    Glucose 05/31/2021 95  70 - 110 mg/dL Final    BUN 05/31/2021 6  6 - 20 mg/dL Final    Creatinine 05/31/2021 0.8  0.5 - 1.4 mg/dL Final    Calcium 05/31/2021 9.5  8.7 - 10.5 mg/dL Final    Total Protein 05/31/2021 7.7  6.0 - 8.4 g/dL Final    Albumin 05/31/2021 4.0  3.5 - 5.2 g/dL Final    Total Bilirubin 05/31/2021 0.4  0.1 - 1.0 mg/dL Final    Comment: For infants and newborns, interpretation of results should be based  on gestational age, weight and in agreement with clinical  observations.    Premature Infant recommended reference ranges:  Up to 24 hours.............<8.0 mg/dL  Up to 48 hours............<12.0 mg/dL  3-5 days..................<15.0 mg/dL  6-29 days.................<15.0 mg/dL      Alkaline Phosphatase 05/31/2021 70  55 - 135 U/L Final    AST 05/31/2021 18  10 - 40 U/L Final    ALT 05/31/2021 10  10 - 44 U/L Final    Anion Gap 05/31/2021 8  8 - 16 mmol/L Final    eGFR if African American 05/31/2021 >60.0  >60 mL/min/1.73 m^2 Final    eGFR if non African American 05/31/2021  >60.0  >60 mL/min/1.73 m^2 Final    Comment: Calculation used to obtain the estimated glomerular filtration  rate (eGFR) is the CKD-EPI equation.       WBC 05/31/2021 5.07  3.90 - 12.70 K/uL Final    RBC 05/31/2021 4.85  4.00 - 5.40 M/uL Final    Hemoglobin 05/31/2021 12.4  12.0 - 16.0 g/dL Final    Hematocrit 05/31/2021 38.2  37.0 - 48.5 % Final    MCV 05/31/2021 79 (A) 82 - 98 fL Final    MCH 05/31/2021 25.6 (A) 27.0 - 31.0 pg Final    MCHC 05/31/2021 32.5  32.0 - 36.0 g/dL Final    RDW 05/31/2021 17.0 (A) 11.5 - 14.5 % Final    Platelets 05/31/2021 420  150 - 450 K/uL Final    MPV 05/31/2021 10.1  9.2 - 12.9 fL Final    Immature Granulocytes 05/31/2021 0.2  0.0 - 0.5 % Final    Gran # (ANC) 05/31/2021 3.1  1.8 - 7.7 K/uL Final    Immature Grans (Abs) 05/31/2021 0.01  0.00 - 0.04 K/uL Final    Comment: Mild elevation in immature granulocytes is non specific and   can be seen in a variety of conditions including stress response,   acute inflammation, trauma and pregnancy. Correlation with other   laboratory and clinical findings is essential.      Lymph # 05/31/2021 1.5  1.0 - 4.8 K/uL Final    Mono # 05/31/2021 0.3  0.3 - 1.0 K/uL Final    Eos # 05/31/2021 0.1  0.0 - 0.5 K/uL Final    Baso # 05/31/2021 0.03  0.00 - 0.20 K/uL Final    nRBC 05/31/2021 0  0 /100 WBC Final    Gran % 05/31/2021 61.9  38.0 - 73.0 % Final    Lymph % 05/31/2021 30.0  18.0 - 48.0 % Final    Mono % 05/31/2021 6.3  4.0 - 15.0 % Final    Eosinophil % 05/31/2021 1.0  0.0 - 8.0 % Final    Basophil % 05/31/2021 0.6  0.0 - 1.9 % Final    Differential Method 05/31/2021 Automated   Final           Oncology History    No history exists.     Assessment :       1. Sickle cell trait    2. Arthralgia, unspecified joint    3. Microcytosis        Plan :     Pt [facility] She is worried that her chronic pain could be related to the sickle cell  trait.   I do not have access to her hemoglobinopathy report. Her last labs in chart are from 2021 which show microcytosis , no anemia.   Pt was assured that sickle cell trait doesn't cause chronic pain and she needs to continue f/u with pcp for futher evaluation of her pain.   Plan to order cbc and electrophoresis today.  Pt requested pain medication. She was advised to follow up with pcp office for that.         Route Chart for Scheduling  Med Onc Route Chart for Scheduling         Electronically signed by Pearl Bergeron    [facility]      Future Appointments   Date Time Provider Department Center   9/14/2022  2:00 PM [facility]           This note was created with voice recognition software.  Grammatical, syntax and spelling errors may be inevitable.

## 2022-03-28 ENCOUNTER — PATIENT OUTREACH (OUTPATIENT)
Dept: ADMINISTRATIVE | Facility: HOSPITAL | Age: 27
End: 2022-03-28
Payer: MEDICAID

## 2022-03-28 ENCOUNTER — PATIENT MESSAGE (OUTPATIENT)
Dept: ADMINISTRATIVE | Facility: HOSPITAL | Age: 27
End: 2022-03-28
Payer: MEDICAID

## 2022-03-28 ENCOUNTER — TELEPHONE (OUTPATIENT)
Dept: ADMINISTRATIVE | Facility: HOSPITAL | Age: 27
End: 2022-03-28
Payer: MEDICAID

## 2022-03-28 NOTE — TELEPHONE ENCOUNTER
Pt is due for annual exam, follow up hypertension.   I am unable to schedule appt.  Please call pt to assist scheduling.

## 2022-03-28 NOTE — PROGRESS NOTES
Working HTN Report.    Requested updated bp reading. States she is not at home right now so unable to check it. Agreed to check it and send reading through pt portal. Mess sent as reminder.  Advised due for annual exam. Told her I would request someone contact her to schedule appt.

## 2022-05-13 ENCOUNTER — PATIENT MESSAGE (OUTPATIENT)
Dept: INTERNAL MEDICINE | Facility: CLINIC | Age: 27
End: 2022-05-13

## 2022-05-13 ENCOUNTER — OFFICE VISIT (OUTPATIENT)
Dept: INTERNAL MEDICINE | Facility: CLINIC | Age: 27
End: 2022-05-13
Payer: MEDICAID

## 2022-05-13 ENCOUNTER — LAB VISIT (OUTPATIENT)
Dept: LAB | Facility: HOSPITAL | Age: 27
End: 2022-05-13
Attending: FAMILY MEDICINE
Payer: MEDICAID

## 2022-05-13 VITALS
WEIGHT: 127.19 LBS | SYSTOLIC BLOOD PRESSURE: 122 MMHG | HEART RATE: 90 BPM | DIASTOLIC BLOOD PRESSURE: 80 MMHG | HEIGHT: 62 IN | RESPIRATION RATE: 18 BRPM | BODY MASS INDEX: 23.4 KG/M2 | OXYGEN SATURATION: 98 %

## 2022-05-13 DIAGNOSIS — Z86.2 HISTORY OF SICKLE CELL TRAIT: ICD-10-CM

## 2022-05-13 DIAGNOSIS — Z00.00 ROUTINE PHYSICAL EXAMINATION: Primary | ICD-10-CM

## 2022-05-13 DIAGNOSIS — D64.9 ANEMIA, UNSPECIFIED TYPE: ICD-10-CM

## 2022-05-13 DIAGNOSIS — Z00.00 ROUTINE PHYSICAL EXAMINATION: ICD-10-CM

## 2022-05-13 LAB
ALBUMIN SERPL BCP-MCNC: 4.3 G/DL (ref 3.5–5.2)
ALP SERPL-CCNC: 68 U/L (ref 55–135)
ALT SERPL W/O P-5'-P-CCNC: 9 U/L (ref 10–44)
ANION GAP SERPL CALC-SCNC: 9 MMOL/L (ref 8–16)
AST SERPL-CCNC: 21 U/L (ref 10–40)
BILIRUB SERPL-MCNC: 0.5 MG/DL (ref 0.1–1)
BUN SERPL-MCNC: 10 MG/DL (ref 6–20)
CALCIUM SERPL-MCNC: 9.8 MG/DL (ref 8.7–10.5)
CHLORIDE SERPL-SCNC: 104 MMOL/L (ref 95–110)
CO2 SERPL-SCNC: 24 MMOL/L (ref 23–29)
CREAT SERPL-MCNC: 1 MG/DL (ref 0.5–1.4)
EST. GFR  (AFRICAN AMERICAN): >60 ML/MIN/1.73 M^2
EST. GFR  (NON AFRICAN AMERICAN): >60 ML/MIN/1.73 M^2
GLUCOSE SERPL-MCNC: 89 MG/DL (ref 70–110)
POTASSIUM SERPL-SCNC: 3.7 MMOL/L (ref 3.5–5.1)
PROT SERPL-MCNC: 8 G/DL (ref 6–8.4)
SODIUM SERPL-SCNC: 137 MMOL/L (ref 136–145)
TSH SERPL DL<=0.005 MIU/L-ACNC: 1.02 UIU/ML (ref 0.4–4)

## 2022-05-13 PROCEDURE — 1159F PR MEDICATION LIST DOCUMENTED IN MEDICAL RECORD: ICD-10-PCS | Mod: CPTII,,, | Performed by: FAMILY MEDICINE

## 2022-05-13 PROCEDURE — 83020 HEMOGLOBIN ELECTROPHORESIS: CPT | Mod: 91 | Performed by: FAMILY MEDICINE

## 2022-05-13 PROCEDURE — 99999 PR PBB SHADOW E&M-EST. PATIENT-LVL III: ICD-10-PCS | Mod: PBBFAC,,, | Performed by: FAMILY MEDICINE

## 2022-05-13 PROCEDURE — 99395 PREV VISIT EST AGE 18-39: CPT | Mod: S$PBB,,, | Performed by: FAMILY MEDICINE

## 2022-05-13 PROCEDURE — 3079F PR MOST RECENT DIASTOLIC BLOOD PRESSURE 80-89 MM HG: ICD-10-PCS | Mod: CPTII,,, | Performed by: FAMILY MEDICINE

## 2022-05-13 PROCEDURE — 99213 OFFICE O/P EST LOW 20 MIN: CPT | Mod: PBBFAC | Performed by: FAMILY MEDICINE

## 2022-05-13 PROCEDURE — 99395 PR PREVENTIVE VISIT,EST,18-39: ICD-10-PCS | Mod: S$PBB,,, | Performed by: FAMILY MEDICINE

## 2022-05-13 PROCEDURE — 1159F MED LIST DOCD IN RCRD: CPT | Mod: CPTII,,, | Performed by: FAMILY MEDICINE

## 2022-05-13 PROCEDURE — 84443 ASSAY THYROID STIM HORMONE: CPT | Performed by: FAMILY MEDICINE

## 2022-05-13 PROCEDURE — 3008F PR BODY MASS INDEX (BMI) DOCUMENTED: ICD-10-PCS | Mod: CPTII,,, | Performed by: FAMILY MEDICINE

## 2022-05-13 PROCEDURE — 85660 RBC SICKLE CELL TEST: CPT | Performed by: FAMILY MEDICINE

## 2022-05-13 PROCEDURE — 3074F SYST BP LT 130 MM HG: CPT | Mod: CPTII,,, | Performed by: FAMILY MEDICINE

## 2022-05-13 PROCEDURE — 3079F DIAST BP 80-89 MM HG: CPT | Mod: CPTII,,, | Performed by: FAMILY MEDICINE

## 2022-05-13 PROCEDURE — 1160F PR REVIEW ALL MEDS BY PRESCRIBER/CLIN PHARMACIST DOCUMENTED: ICD-10-PCS | Mod: CPTII,,, | Performed by: FAMILY MEDICINE

## 2022-05-13 PROCEDURE — 80053 COMPREHEN METABOLIC PANEL: CPT | Performed by: FAMILY MEDICINE

## 2022-05-13 PROCEDURE — 1160F RVW MEDS BY RX/DR IN RCRD: CPT | Mod: CPTII,,, | Performed by: FAMILY MEDICINE

## 2022-05-13 PROCEDURE — 36415 COLL VENOUS BLD VENIPUNCTURE: CPT | Performed by: FAMILY MEDICINE

## 2022-05-13 PROCEDURE — 3074F PR MOST RECENT SYSTOLIC BLOOD PRESSURE < 130 MM HG: ICD-10-PCS | Mod: CPTII,,, | Performed by: FAMILY MEDICINE

## 2022-05-13 PROCEDURE — 99999 PR PBB SHADOW E&M-EST. PATIENT-LVL III: CPT | Mod: PBBFAC,,, | Performed by: FAMILY MEDICINE

## 2022-05-13 PROCEDURE — 3008F BODY MASS INDEX DOCD: CPT | Mod: CPTII,,, | Performed by: FAMILY MEDICINE

## 2022-05-13 PROCEDURE — 85660 RBC SICKLE CELL TEST: CPT | Mod: 91 | Performed by: FAMILY MEDICINE

## 2022-05-13 PROCEDURE — 85025 COMPLETE CBC W/AUTO DIFF WBC: CPT | Performed by: FAMILY MEDICINE

## 2022-05-13 NOTE — PROGRESS NOTES
Christopher Webber  05/13/2022  [id number]    Chantel Boewn MD  Patient Care Team:  Chantel Bowen MD as PCP - General (Internal Medicine)  Rose Shaw LPN as Care Coordinator (Internal Medicine)  Jordyn Lan MD (Obstetrics and Gynecology)    Has the patient seen any provider outside of the network since the last visit ? (no). If yes, HIPPA forms completed and records requested.      Visit Type:a scheduled routine follow-up visit    Chief Complaint:  Chief Complaint   Patient presents with    Annual Exam       History of Present Illness:  HPI Ms. Webber presents today for her annual exam.   No changes in medical, surgical or family history         Review of Systems   Constitutional: Negative for chills and fever.   HENT: Negative for congestion and tinnitus.    Eyes: Negative for blurred vision, pain and discharge.   Respiratory: Negative for cough and wheezing.    Cardiovascular: Negative for chest pain, palpitations, orthopnea and leg swelling.   Gastrointestinal: Negative for abdominal pain, blood in stool, constipation, diarrhea, heartburn, nausea and vomiting.   Genitourinary: Negative for dysuria, flank pain, frequency, hematuria and urgency.   Skin: Negative for itching and rash.   Neurological: Positive for headaches. Negative for dizziness and tingling.   Psychiatric/Behavioral: Negative for depression.         Screening Questionnaires:    In the last two weeks how often have you felt down, depressed, or hopeless ( no )    In the last two weeks how often have you had little interest or pleasure in doing  (no )    In the last two weeks how often have you been bothered by the following problems:  1. Feeling nervous, anxious, or on edge ( frequent )    2. Not being able to stop or control worrying ( no)    3. Worrying too much about different things ( no)    4. Trouble relaxing ( no )    5. Being so restless that it is hard to sit still  (no )    6. Becoming easily annoyed or irritable (no)    7. Feeling  afraid as if something awful might happen (no )    How often do you have a drink containing Alcohol? occasional, social use     Do you exercise  (yes ) moderately active    Do you take a baby Aspirin daily ( no)    Do you have an advance directive ( no ) The patient was given information regarding Living Will/Durable Power-of- if requested.     The following were reviewed: Active problem list, medication list, allergies, family history, social history, and Health Maintenance.     History:  Past Medical History:   Diagnosis Date    Anxiety state 2018 2:22:17 PM    [facility] Historical - Quick Add: Anxiety and depression-No Additional Notes    Anxiety state 2018 2:22:17 PM    [facility] - Quick Add: Anxiety and depression-No Additional Notes    Depression, major, recurrent, mild 2020    Essential hypertension 9/10/2019    Essential hypertension 2018 2:24:01 PM    [facility] Historical - Cardiovascular: Hypertension-No Additional Notes    Essential hypertension 2018 2:24:01 PM    [facility] Historical - Cardiovascular: Hypertension-No Additional Notes    History of Narcolepsy     10 years ago    Spontaneous  2019 3:15:45 PM    [facility] Historical - Unknown: Miscarriage-No Additional Notes    Spontaneous  2019 3:15:45 PM    [facility] Historical - Unknown: Miscarriage-No Additional Notes     Past Surgical History:   Procedure Laterality Date     SECTION      TONSILLECTOMY       Family History   Problem Relation Age of Onset    Breast cancer Neg Hx     Cancer Neg Hx     Colon cancer Neg Hx     Diabetes Neg Hx     Eclampsia Neg Hx     Hypertension Neg Hx     Miscarriages / Stillbirths Neg Hx      labor Neg Hx     Ovarian cancer Neg Hx     Stroke Neg Hx      Social History     Socioeconomic History    Marital status: Single   Tobacco Use    Smoking status: Never Smoker    Smokeless tobacco: Never  Used   Substance and Sexual Activity    Alcohol use: Not Currently     Alcohol/week: 0.0 standard drinks     Comment: social    Drug use: No    Sexual activity: Yes     Partners: Male     Birth control/protection: Condom, None     Patient Active Problem List   Diagnosis    Primary narcolepsy without cataplexy    Idiopathic hypersomnia without long sleep time    Essential hypertension    Irritable bowel syndrome with constipation    Depression, major, recurrent, mild    Generalized anxiety disorder with panic attacks     Review of patient's allergies indicates:   Allergen Reactions    Asa [aspirin] Other (See Comments)     IBS        Health Maintenance  Health Maintenance Topics with due status: Not Due       Topic Last Completion Date    TETANUS VACCINE 04/12/2018    Influenza Vaccine 12/27/2019    Pap Smear 09/16/2020     Health Maintenance Due   Topic Date Due    COVID-19 Vaccine (1) Never done    Pneumococcal Vaccines (Age 0-64) (2 - PCV) 05/31/2022       Medications:  Current Outpatient Medications on File Prior to Visit   Medication Sig Dispense Refill    drospirenone, contraceptive, (SLYND) 4 mg (28) Tab Take 1 tablet (4 mg total) by mouth once daily at 6am. 90 tablet 3    ALPRAZolam (XANAX) 0.5 MG tablet Take 1 tablet (0.5 mg total) by mouth 2 (two) times daily as needed for Anxiety. (Patient not taking: Reported on 5/13/2022) 24 tablet 0    amLODIPine (NORVASC) 10 MG tablet Take 1 tablet (10 mg total) by mouth once daily. (Patient not taking: Reported on 5/13/2022) 90 tablet 0    cyclobenzaprine (FLEXERIL) 10 MG tablet TAKE 1 TABLET(10 MG) BY MOUTH THREE TIMES DAILY AS NEEDED FOR MUSCLE SPASMS (Patient not taking: Reported on 5/13/2022) 30 tablet 1    traMADoL (ULTRAM) 50 mg tablet Take 1 tablet (50 mg total) by mouth every 6 (six) hours. (Patient not taking: Reported on 5/13/2022) 28 tablet 0     No current facility-administered medications on file prior to visit.       Medications have  been reviewed and reconciled with patient at visit today.    Barriers to medications present (no )    Adverse reactions to current medications (no)    Over the counter medications reviewed (Yes) and if needed added to active Medication list.    Exam:  Vitals:    05/13/22 1511   BP: 122/80   Pulse: 90   Resp: 18     Weight: 57.7 kg (127 lb 3.3 oz)   Body mass index is 23.27 kg/m².      Physical Exam  Vitals reviewed.   Constitutional:       General: She is not in acute distress.  HENT:      Head: Normocephalic and atraumatic.      Right Ear: External ear normal.      Left Ear: External ear normal.      Nose: Nose normal.   Eyes:      General:         Right eye: No discharge.         Left eye: No discharge.      Pupils: Pupils are equal, round, and reactive to light.   Neck:      Thyroid: No thyromegaly.   Cardiovascular:      Rate and Rhythm: Normal rate and regular rhythm.      Heart sounds: Normal heart sounds. No murmur heard.  Pulmonary:      Effort: Pulmonary effort is normal. No respiratory distress.      Breath sounds: Normal breath sounds. No wheezing.   Abdominal:      General: Bowel sounds are normal. There is no distension.      Palpations: Abdomen is soft.      Tenderness: There is no abdominal tenderness.   Musculoskeletal:         General: Normal range of motion.      Cervical back: Normal range of motion and neck supple.   Skin:     General: Skin is warm and dry.      Findings: No rash.   Neurological:      Mental Status: She is alert and oriented to person, place, and time.      Coordination: Coordination normal.   Psychiatric:         Behavior: Behavior normal.         Laboratory Reviewed: (Yes)  Lab Results   Component Value Date    WBC 5.07 05/31/2021    HGB 12.4 05/31/2021    HCT 38.2 05/31/2021     05/31/2021    CHOL 183 05/31/2021    TRIG 48 05/31/2021    HDL 70 05/31/2021    ALT 10 05/31/2021    AST 18 05/31/2021     05/31/2021    K 3.6 05/31/2021     05/31/2021    CREATININE  0.8 05/31/2021    BUN 6 05/31/2021    CO2 23 05/31/2021    TSH 0.554 12/27/2019    HGBA1C 5.0 11/02/2018       Assessment:  The primary encounter diagnosis was Routine physical examination. Diagnoses of History of sickle cell trait and Anemia, unspecified type were also pertinent to this visit.    Plan:  Routine physical examination  -     Lipid Panel; Future; Expected date: 05/13/2022  -     Comprehensive Metabolic Panel; Future; Expected date: 05/13/2022  -     CBC Auto Differential; Future; Expected date: 05/13/2022  -     TSH; Future; Expected date: 05/13/2022    History of sickle cell trait  -     Cancel: Alpha-Globin Gene Analysis; Future; Expected date: 05/13/2022  -     SICKLE CELL SCREEN; Future; Expected date: 05/13/2022  -     Thalasseima and Hemoglobinopathy Eval; Future; Expected date: 05/13/2022    Anemia, unspecified type  -     Thalasseima and Hemoglobinopathy Eval; Future; Expected date: 05/13/2022      -Patient's lab results were reviewed and discussed with patient  -Treatment options and alternatives were discussed with the patient. Patient expressed understanding. Patient was given the opportunity to ask questions and be an active participant in their medical care. Patient had no further questions or concerns at this time.   -Documentation of patient's health and condition was obtained from family member who was present during visit.  -Patient is an overall moderate risk for health complications from their medical conditions.     Follow up: follow up 1 year for annual       Care Plan/Goals: Reviewed Yes   Goals        Patient Stated      Blood Pressure < 130/80 (pt-stated)        Other      Exercise at least 150 minutes per week.       Increase physical activity       Take at least one BP reading per week at various times of the day               After visit summary printed and given to patient upon discharge.  Patient goals and care plan are included in After visit summary.

## 2022-05-14 LAB
BASOPHILS # BLD AUTO: 0.04 K/UL (ref 0–0.2)
BASOPHILS NFR BLD: 0.5 % (ref 0–1.9)
DIFFERENTIAL METHOD: NORMAL
EOSINOPHIL # BLD AUTO: 0 K/UL (ref 0–0.5)
EOSINOPHIL NFR BLD: 0.5 % (ref 0–8)
ERYTHROCYTE [DISTWIDTH] IN BLOOD BY AUTOMATED COUNT: 14.3 % (ref 11.5–14.5)
HCT VFR BLD AUTO: 42.4 % (ref 37–48.5)
HGB BLD-MCNC: 14.1 G/DL (ref 12–16)
HGB S BLD QL SOLY: POSITIVE
IMM GRANULOCYTES # BLD AUTO: 0.02 K/UL (ref 0–0.04)
IMM GRANULOCYTES NFR BLD AUTO: 0.3 % (ref 0–0.5)
LYMPHOCYTES # BLD AUTO: 1.8 K/UL (ref 1–4.8)
LYMPHOCYTES NFR BLD: 24.1 % (ref 18–48)
MCH RBC QN AUTO: 28.3 PG (ref 27–31)
MCHC RBC AUTO-ENTMCNC: 33.3 G/DL (ref 32–36)
MCV RBC AUTO: 85 FL (ref 82–98)
MONOCYTES # BLD AUTO: 0.6 K/UL (ref 0.3–1)
MONOCYTES NFR BLD: 7.6 % (ref 4–15)
NEUTROPHILS # BLD AUTO: 5 K/UL (ref 1.8–7.7)
NEUTROPHILS NFR BLD: 67 % (ref 38–73)
NRBC BLD-RTO: 0 /100 WBC
PLATELET # BLD AUTO: 410 K/UL (ref 150–450)
PMV BLD AUTO: 9.9 FL (ref 9.2–12.9)
RBC # BLD AUTO: 4.99 M/UL (ref 4–5.4)
WBC # BLD AUTO: 7.48 K/UL (ref 3.9–12.7)

## 2022-05-17 ENCOUNTER — PATIENT MESSAGE (OUTPATIENT)
Dept: INTERNAL MEDICINE | Facility: CLINIC | Age: 27
End: 2022-05-17
Payer: MEDICAID

## 2022-05-17 LAB
FERRITIN SERPL-MCNC: 9 MCG/L (ref 11–307)
HGB A MFR BLD ELPH: 57.2 % (ref 95.8–98)
HGB A2 MFR BLD: 3 % (ref 2–3.3)
HGB A2+XXX MFR BLD ELPH: ABNORMAL %
HGB F MFR BLD: 0.3 % (ref 0–0.9)
HGB S BLD QL: POSITIVE
HGB XXX MFR BLD ELPH: ABNORMAL %
PATH REV BLD -IMP: ABNORMAL
PROVIDER SIGNING NAME: ABNORMAL

## 2022-07-16 ENCOUNTER — PATIENT MESSAGE (OUTPATIENT)
Dept: INTERNAL MEDICINE | Facility: CLINIC | Age: 27
End: 2022-07-16
Payer: MEDICAID

## 2022-07-16 DIAGNOSIS — M54.50 LOW BACK PAIN AT MULTIPLE SITES: ICD-10-CM

## 2022-07-16 DIAGNOSIS — D57.3 SICKLE CELL TRAIT: ICD-10-CM

## 2022-07-16 DIAGNOSIS — M54.2 NECK PAIN: ICD-10-CM

## 2022-07-18 ENCOUNTER — PATIENT MESSAGE (OUTPATIENT)
Dept: INTERNAL MEDICINE | Facility: CLINIC | Age: 27
End: 2022-07-18
Payer: MEDICAID

## 2022-07-18 RX ORDER — CYCLOBENZAPRINE HCL 10 MG
TABLET ORAL
Qty: 30 TABLET | Refills: 1 | Status: SHIPPED | OUTPATIENT
Start: 2022-07-18 | End: 2023-01-03 | Stop reason: SDUPTHER

## 2022-07-18 RX ORDER — TRAMADOL HYDROCHLORIDE 50 MG/1
50 TABLET ORAL EVERY 6 HOURS
Qty: 28 TABLET | Refills: 0 | Status: SHIPPED | OUTPATIENT
Start: 2022-07-18 | End: 2022-12-29 | Stop reason: SDUPTHER

## 2022-07-19 ENCOUNTER — PATIENT MESSAGE (OUTPATIENT)
Dept: INTERNAL MEDICINE | Facility: CLINIC | Age: 27
End: 2022-07-19
Payer: MEDICAID

## 2022-07-19 DIAGNOSIS — F41.0 GENERALIZED ANXIETY DISORDER WITH PANIC ATTACKS: ICD-10-CM

## 2022-07-19 DIAGNOSIS — F41.1 GENERALIZED ANXIETY DISORDER WITH PANIC ATTACKS: ICD-10-CM

## 2022-07-19 RX ORDER — HYDROXYZINE HYDROCHLORIDE 10 MG/1
TABLET, FILM COATED ORAL
Qty: 60 TABLET | Refills: 1 | Status: SHIPPED | OUTPATIENT
Start: 2022-07-19 | End: 2023-06-29

## 2022-08-22 ENCOUNTER — OFFICE VISIT (OUTPATIENT)
Dept: DERMATOLOGY | Facility: CLINIC | Age: 27
End: 2022-08-22
Payer: MEDICAID

## 2022-08-22 ENCOUNTER — PATIENT MESSAGE (OUTPATIENT)
Dept: DERMATOLOGY | Facility: CLINIC | Age: 27
End: 2022-08-22

## 2022-08-22 DIAGNOSIS — L70.0 ACNE VULGARIS: Primary | ICD-10-CM

## 2022-08-22 PROCEDURE — 1159F PR MEDICATION LIST DOCUMENTED IN MEDICAL RECORD: ICD-10-PCS | Mod: CPTII,95,, | Performed by: STUDENT IN AN ORGANIZED HEALTH CARE EDUCATION/TRAINING PROGRAM

## 2022-08-22 PROCEDURE — 99204 OFFICE O/P NEW MOD 45 MIN: CPT | Mod: 95,,, | Performed by: STUDENT IN AN ORGANIZED HEALTH CARE EDUCATION/TRAINING PROGRAM

## 2022-08-22 PROCEDURE — 1160F RVW MEDS BY RX/DR IN RCRD: CPT | Mod: CPTII,95,, | Performed by: STUDENT IN AN ORGANIZED HEALTH CARE EDUCATION/TRAINING PROGRAM

## 2022-08-22 PROCEDURE — 1160F PR REVIEW ALL MEDS BY PRESCRIBER/CLIN PHARMACIST DOCUMENTED: ICD-10-PCS | Mod: CPTII,95,, | Performed by: STUDENT IN AN ORGANIZED HEALTH CARE EDUCATION/TRAINING PROGRAM

## 2022-08-22 PROCEDURE — 99204 PR OFFICE/OUTPT VISIT, NEW, LEVL IV, 45-59 MIN: ICD-10-PCS | Mod: 95,,, | Performed by: STUDENT IN AN ORGANIZED HEALTH CARE EDUCATION/TRAINING PROGRAM

## 2022-08-22 PROCEDURE — 1159F MED LIST DOCD IN RCRD: CPT | Mod: CPTII,95,, | Performed by: STUDENT IN AN ORGANIZED HEALTH CARE EDUCATION/TRAINING PROGRAM

## 2022-08-22 RX ORDER — TRETINOIN 0.5 MG/G
CREAM TOPICAL NIGHTLY
Qty: 45 G | Refills: 5 | Status: SHIPPED | OUTPATIENT
Start: 2022-08-22 | End: 2022-10-25

## 2022-08-22 NOTE — PROGRESS NOTES
Patient Information  Name: Christopher Webber  : 1995  MRN: [id number]     Referring Physician:  Dr. Kapoor   Primary Care Physician:  Dr. Chantel Bowen MD   Date of Visit: 2022      Subjective:       Christopher Webber is a 26 y.o. female who presents for acne    HPI  The patient location is: [place], [place]  The chief complaint leading to consultation is: acne    Visit type: audiovisual    Face to Face time with patient: 10 min  12 minutes of total time spent on the encounter, which includes face to face time and non-face to face time preparing to see the patient (eg, review of tests), Obtaining and/or reviewing separately obtained history, Documenting clinical information in the electronic or other health record, Independently interpreting results (not separately reported) and communicating results to the patient/family/caregiver, or Care coordination (not separately reported).     Each patient to whom he or she provides medical services by telemedicine is:  (1) informed of the relationship between the physician and patient and the respective role of any other health care provider with respect to management of the patient; and (2) notified that he or she may decline to receive medical services by telemedicine and may withdraw from such care at any time.    Notes:   Patient with new complaint of lesion(s)  Location: face  Duration: years  Symptoms: black/white heads  Relieving factors/Previous treatments: OTC differin with no improvement    Patient was last seen:Visit date not found     Prior notes by myself reviewed.   Clinical documentation obtained by nursing staff reviewed.    Review of Systems   Skin: Negative for itching and rash.        Objective:    Physical Exam   Constitutional: She appears well-developed and well-nourished. No distress.   Neurological: She is alert and oriented to person, place, and time. She is not disoriented.   Psychiatric: She has a normal mood and affect.   Skin:   Areas  Examined (abnormalities noted in diagram):   Head / Face Inspection Performed  Neck Inspection Performed              Diagram Legend     Erythematous scaling macule/papule c/w actinic keratosis       Vascular papule c/w angioma      Pigmented verrucoid papule/plaque c/w seborrheic keratosis      Yellow umbilicated papule c/w sebaceous hyperplasia      Irregularly shaped tan macule c/w lentigo     1-2 mm smooth white papules consistent with Milia      Movable subcutaneous cyst with punctum c/w epidermal inclusion cyst      Subcutaneous movable cyst c/w pilar cyst      Firm pink to brown papule c/w dermatofibroma      Pedunculated fleshy papule(s) c/w skin tag(s)      Evenly pigmented macule c/w junctional nevus     Mildly variegated pigmented, slightly irregular-bordered macule c/w mildly atypical nevus      Flesh colored to evenly pigmented papule c/w intradermal nevus       Pink pearly papule/plaque c/w basal cell carcinoma      Erythematous hyperkeratotic cursted plaque c/w SCC      Surgical scar with no sign of skin cancer recurrence      Open and closed comedones      Inflammatory papules and pustules      Verrucoid papule consistent consistent with wart     Erythematous eczematous patches and plaques     Dystrophic onycholytic nail with subungual debris c/w onychomycosis     Umbilicated papule    Erythematous-base heme-crusted tan verrucoid plaque consistent with inflamed seborrheic keratosis     Erythematous Silvery Scaling Plaque c/w Psoriasis     See annotation          [] Data reviewed  [] Independent review of test  [] Management discussed with another provider    Assessment / Plan:        Acne vulgaris  -     tretinoin (RETIN-A) 0.05 % cream; Apply topically every evening. Apply a pea size to entire face  Dispense: 45 g; Refill: 5  - Discussed sal acid, gentle cleansers, moisturizer           LOS NUMBER AND COMPLEXITY OF PROBLEMS    COMPLEXITY OF DATA RISK TOTAL TIME (m)   90872  47665 [] 1 self-limited  or minor problem [x] Minimal to none [] No treatment recommended or patient to monitor 15-29  10-19   03617  47103 Low  [] 2 or > self limited or minor problems  [] 1 stable chronic illness  [] 1 acute, uncomplicated illness or injury Limited (2)  [] Prior external notes from each unique source  [] Review result of each unique test  [] Order each unique test []  Low  OTC medications, minor skin biopsy 30-44  20-29   06909  16141 Moderate  [x]  1 or > chronic illness with progression, exacerbation or SE of treatment  []  2 or more stable chronic illnesses  []  1 acute illness with systemic symptoms  []  1 acute complicated injury  []  1 undiagnosed new problem with uncertain prognosis Moderate (1/3 below)  []  3 or more data items        *Now includes assessment requiring independent historian  []  Independent interpretation of a test  []  Discuss management/test with another provider Moderate  [x]  Prescription drug mgmt  []  Minor surgery with risk discussed  []  Mgmt limited by social determinates 45-59  30-39   05490  13391 High  []  1 or more chronic illness with severe exacerbation, progression or SE of treatment  []  1 acute or chronic illness/injury that poses a threat to life or bodily function Extensive (2/3 below)  []  3 or more data items        *Now includes assessment requiring independent historian.  []  Independent interpretation of a test  []  Discuss management/test with another provider High  []  Major surgery with risk discussed  []  Drug therapy requiring intensive monitoring for toxicity  []  Hospitalization  []  Decision for DNR 60-74  40-54      Follow up in about 3 months (around 11/22/2022).    Caitlin Tripp MD, FAAD  [facility]

## 2022-10-10 ENCOUNTER — PATIENT MESSAGE (OUTPATIENT)
Dept: INTERNAL MEDICINE | Facility: CLINIC | Age: 27
End: 2022-10-10
Payer: MEDICAID

## 2022-10-10 DIAGNOSIS — K58.1 IRRITABLE BOWEL SYNDROME WITH CONSTIPATION: Primary | ICD-10-CM

## 2022-10-11 ENCOUNTER — PATIENT MESSAGE (OUTPATIENT)
Dept: INTERNAL MEDICINE | Facility: CLINIC | Age: 27
End: 2022-10-11
Payer: MEDICAID

## 2022-10-17 ENCOUNTER — PATIENT MESSAGE (OUTPATIENT)
Dept: INTERNAL MEDICINE | Facility: CLINIC | Age: 27
End: 2022-10-17
Payer: MEDICAID

## 2022-10-25 ENCOUNTER — PATIENT MESSAGE (OUTPATIENT)
Dept: DERMATOLOGY | Facility: CLINIC | Age: 27
End: 2022-10-25
Payer: MEDICAID

## 2022-10-25 DIAGNOSIS — L70.0 ACNE VULGARIS: Primary | ICD-10-CM

## 2022-10-25 RX ORDER — TRETINOIN 1 MG/G
CREAM TOPICAL NIGHTLY
Qty: 45 G | Refills: 5 | Status: SHIPPED | OUTPATIENT
Start: 2022-10-25 | End: 2023-06-29

## 2022-11-07 ENCOUNTER — PATIENT MESSAGE (OUTPATIENT)
Dept: INTERNAL MEDICINE | Facility: CLINIC | Age: 27
End: 2022-11-07
Payer: MEDICAID

## 2022-11-28 ENCOUNTER — PATIENT MESSAGE (OUTPATIENT)
Dept: INTERNAL MEDICINE | Facility: CLINIC | Age: 27
End: 2022-11-28
Payer: MEDICAID

## 2023-01-03 ENCOUNTER — PATIENT MESSAGE (OUTPATIENT)
Dept: INTERNAL MEDICINE | Facility: CLINIC | Age: 28
End: 2023-01-03
Payer: MEDICAID

## 2023-01-03 DIAGNOSIS — M54.2 NECK PAIN: ICD-10-CM

## 2023-01-03 DIAGNOSIS — D57.3 SICKLE CELL TRAIT: ICD-10-CM

## 2023-01-03 DIAGNOSIS — M54.50 LOW BACK PAIN AT MULTIPLE SITES: ICD-10-CM

## 2023-01-03 RX ORDER — CYCLOBENZAPRINE HCL 10 MG
TABLET ORAL
Qty: 30 TABLET | Refills: 1 | Status: SHIPPED | OUTPATIENT
Start: 2023-01-03 | End: 2023-05-31 | Stop reason: SDUPTHER

## 2023-01-03 NOTE — TELEPHONE ENCOUNTER
No new care gaps identified.  [facility] Embedded Care Gaps. Reference number: [id number]. 1/03/2023   1:51:28 PM CST

## 2023-03-17 ENCOUNTER — TELEPHONE (OUTPATIENT)
Dept: OBSTETRICS AND GYNECOLOGY | Facility: CLINIC | Age: 28
End: 2023-03-17
Payer: MEDICAID

## 2023-03-17 NOTE — TELEPHONE ENCOUNTER
----- Message from Natty Anaya sent at 3/17/2023  8:08 AM CDT -----  Pt would like to schedule an appt for a possible yeast infection. Call back number is [phone]. Thx. EL

## 2023-03-22 ENCOUNTER — PATIENT MESSAGE (OUTPATIENT)
Dept: INTERNAL MEDICINE | Facility: CLINIC | Age: 28
End: 2023-03-22
Payer: MEDICAID

## 2023-03-26 NOTE — PROGRESS NOTES
The patient location is: [place] ([place])  The chief complaint leading to consultation is: xanax refill    Visit type: audiovisual    Face to Face time with patient:    minutes of total time spent on the encounter, which includes face to face time and non-face to face time preparing to see the patient (eg, review of tests), Obtaining and/or reviewing separately obtained history, Documenting clinical information in the electronic or other health record, Independently interpreting results (not separately reported) and communicating results to the patient/family/caregiver, or Care coordination (not separately reported).         Each patient to whom he or she provides medical services by telemedicine is:  (1) informed of the relationship between the physician and patient and the respective role of any other health care provider with respect to management of the patient; and (2) notified that he or she may decline to receive medical services by telemedicine and may withdraw from such care at any time.    Notes:   Christopher Webber  27 y.o. Black or  female    No chief complaint on file.      HPI: Here today for Anxiety follow up. Reports being stable on medication. She had been taking xanax as needed  Recently she has felt she needs to take it more often.   She questions being on a stimulant medication for ADHD     Lexapro in the past she didn't like feeling like it changed who she Is     Current Outpatient Medications on File Prior to Visit:  ALPRAZolam (XANAX) 0.5 MG tablet, Take 1 tablet (0.5 mg total) by mouth 2 (two) times daily as needed for Anxiety., Disp: 24 tablet, Rfl: 0  amLODIPine (NORVASC) 10 MG tablet, Take 1 tablet (10 mg total) by mouth once daily., Disp: 90 tablet, Rfl: 0  cyclobenzaprine (FLEXERIL) 10 MG tablet, TAKE 1 TABLET(10 MG) BY MOUTH THREE TIMES DAILY AS NEEDED FOR MUSCLE SPASMS, Disp: 30 tablet, Rfl: 1  drospirenone, contraceptive, (SLYND) 4 mg (28) Tab, Take 1 tablet (4 mg total)  by mouth every evening., Disp: 90 tablet, Rfl: 3  hydrOXYzine HCL (ATARAX) 10 MG Tab, Take 1/2 tablet ( 5 mg) to 1 tablet (10 mg) twice a day, as needed for anxiety or panic attacks, Disp: 60 tablet, Rfl: 1  traMADoL (ULTRAM) 50 mg tablet, Take 1 tablet (50 mg total) by mouth every 6 (six) hours., Disp: 28 tablet, Rfl: 0  tretinoin (RETIN-A) 0.1 % cream, Apply topically every evening., Disp: 45 g, Rfl: 5    No current facility-administered medications on file prior to visit.      Allergies:  Review of patient's allergies indicates:   -- Asa [aspirin] -- Other (See Comments)    --  IBS    PHYSICAL EXAM:    GENERAL: Pleasant, no acute distress  LUNGS: Unlabored respirations    ASSESSMENT/PLAN:  Generalized anxiety disorder with panic attacks  -     ALPRAZolam (XANAX) 0.5 MG tablet; Take 1 tablet (0.5 mg total) by mouth 2 (two) times daily as needed for Anxiety.  Dispense: 24 tablet; Refill: 0    Other orders  -     atomoxetine (STRATTERA) 10 MG capsule; Take 1 capsule (10 mg total) by mouth once daily.  Dispense: 30 capsule; Refill: 0          RTC: 3 months

## 2023-03-27 ENCOUNTER — OFFICE VISIT (OUTPATIENT)
Dept: INTERNAL MEDICINE | Facility: CLINIC | Age: 28
End: 2023-03-27
Payer: MEDICAID

## 2023-03-27 DIAGNOSIS — F41.1 GENERALIZED ANXIETY DISORDER WITH PANIC ATTACKS: ICD-10-CM

## 2023-03-27 DIAGNOSIS — F41.0 GENERALIZED ANXIETY DISORDER WITH PANIC ATTACKS: ICD-10-CM

## 2023-03-27 PROCEDURE — 99213 OFFICE O/P EST LOW 20 MIN: CPT | Mod: 95,,, | Performed by: FAMILY MEDICINE

## 2023-03-27 PROCEDURE — 99213 PR OFFICE/OUTPT VISIT, EST, LEVL III, 20-29 MIN: ICD-10-PCS | Mod: 95,,, | Performed by: FAMILY MEDICINE

## 2023-03-27 RX ORDER — ALPRAZOLAM 0.5 MG/1
0.5 TABLET ORAL 2 TIMES DAILY PRN
Qty: 24 TABLET | Refills: 0 | Status: SHIPPED | OUTPATIENT
Start: 2023-03-27 | End: 2023-05-30

## 2023-03-27 RX ORDER — ATOMOXETINE 10 MG/1
10 CAPSULE ORAL DAILY
Qty: 30 CAPSULE | Refills: 0 | Status: SHIPPED | OUTPATIENT
Start: 2023-03-27 | End: 2023-05-02

## 2023-04-19 ENCOUNTER — PATIENT MESSAGE (OUTPATIENT)
Dept: INTERNAL MEDICINE | Facility: CLINIC | Age: 28
End: 2023-04-19
Payer: MEDICAID

## 2023-05-02 RX ORDER — ATOMOXETINE 10 MG/1
CAPSULE ORAL
Qty: 30 CAPSULE | Refills: 0 | Status: SHIPPED | OUTPATIENT
Start: 2023-05-02 | End: 2023-05-30

## 2023-05-02 NOTE — TELEPHONE ENCOUNTER
No care due was identified.  [facility] Embedded Care Due Messages. Reference number: [id number].   5/02/2023 1:31:18 PM CDT

## 2023-05-10 ENCOUNTER — PATIENT MESSAGE (OUTPATIENT)
Dept: INTERNAL MEDICINE | Facility: CLINIC | Age: 28
End: 2023-05-10
Payer: MEDICAID

## 2023-05-10 DIAGNOSIS — F41.0 GENERALIZED ANXIETY DISORDER WITH PANIC ATTACKS: Primary | ICD-10-CM

## 2023-05-10 DIAGNOSIS — F33.0 DEPRESSION, MAJOR, RECURRENT, MILD: ICD-10-CM

## 2023-05-10 DIAGNOSIS — F41.1 GENERALIZED ANXIETY DISORDER WITH PANIC ATTACKS: Primary | ICD-10-CM

## 2023-05-29 ENCOUNTER — PATIENT MESSAGE (OUTPATIENT)
Dept: INTERNAL MEDICINE | Facility: CLINIC | Age: 28
End: 2023-05-29
Payer: MEDICAID

## 2023-05-29 DIAGNOSIS — F41.0 GENERALIZED ANXIETY DISORDER WITH PANIC ATTACKS: ICD-10-CM

## 2023-05-29 DIAGNOSIS — F41.1 GENERALIZED ANXIETY DISORDER WITH PANIC ATTACKS: ICD-10-CM

## 2023-05-29 NOTE — TELEPHONE ENCOUNTER
No care due was identified.  [facility] Embedded Care Due Messages. Reference number: [id number].   5/29/2023 5:39:59 PM CDT

## 2023-05-30 RX ORDER — LORATADINE 10 MG
TABLET,CHEWABLE ORAL
COMMUNITY
End: 2023-05-31

## 2023-05-30 RX ORDER — ALPRAZOLAM 0.5 MG/1
TABLET ORAL
Qty: 24 TABLET | Refills: 0 | Status: SHIPPED | OUTPATIENT
Start: 2023-05-30 | End: 2023-06-29

## 2023-05-30 RX ORDER — FAMOTIDINE 40 MG/1
TABLET, FILM COATED ORAL
COMMUNITY
Start: 2023-03-06 | End: 2023-06-29

## 2023-05-31 ENCOUNTER — OFFICE VISIT (OUTPATIENT)
Dept: INTERNAL MEDICINE | Facility: CLINIC | Age: 28
End: 2023-05-31
Payer: MEDICAID

## 2023-05-31 VITALS — SYSTOLIC BLOOD PRESSURE: 118 MMHG | DIASTOLIC BLOOD PRESSURE: 74 MMHG

## 2023-05-31 DIAGNOSIS — M54.2 NECK PAIN: ICD-10-CM

## 2023-05-31 DIAGNOSIS — D57.3 SICKLE CELL TRAIT: ICD-10-CM

## 2023-05-31 DIAGNOSIS — M54.50 LOW BACK PAIN AT MULTIPLE SITES: ICD-10-CM

## 2023-05-31 PROCEDURE — 3074F PR MOST RECENT SYSTOLIC BLOOD PRESSURE < 130 MM HG: ICD-10-PCS | Mod: CPTII,95,, | Performed by: FAMILY MEDICINE

## 2023-05-31 PROCEDURE — 3074F SYST BP LT 130 MM HG: CPT | Mod: CPTII,95,, | Performed by: FAMILY MEDICINE

## 2023-05-31 PROCEDURE — 99213 PR OFFICE/OUTPT VISIT, EST, LEVL III, 20-29 MIN: ICD-10-PCS | Mod: 95,,, | Performed by: FAMILY MEDICINE

## 2023-05-31 PROCEDURE — 3078F DIAST BP <80 MM HG: CPT | Mod: CPTII,95,, | Performed by: FAMILY MEDICINE

## 2023-05-31 PROCEDURE — 99213 OFFICE O/P EST LOW 20 MIN: CPT | Mod: 95,,, | Performed by: FAMILY MEDICINE

## 2023-05-31 PROCEDURE — 3078F PR MOST RECENT DIASTOLIC BLOOD PRESSURE < 80 MM HG: ICD-10-PCS | Mod: CPTII,95,, | Performed by: FAMILY MEDICINE

## 2023-05-31 RX ORDER — CYCLOBENZAPRINE HCL 10 MG
TABLET ORAL
Qty: 40 TABLET | Refills: 1 | Status: SHIPPED | OUTPATIENT
Start: 2023-05-31 | End: 2023-06-29

## 2023-05-31 NOTE — PROGRESS NOTES
The patient location is: [place] ([facility])  The chief complaint leading to consultation is: follow up     Visit type: audiovisual    Face to Face time with patient: 6 minutes   6 minutes of total time spent on the encounter, which includes face to face time and non-face to face time preparing to see the patient (eg, review of tests), Obtaining and/or reviewing separately obtained history, Documenting clinical information in the electronic or other health record, Independently interpreting results (not separately reported) and communicating results to the patient/family/caregiver, or Care coordination (not separately reported).         Each patient to whom he or she provides medical services by telemedicine is:  (1) informed of the relationship between the physician and patient and the respective role of any other health care provider with respect to management of the patient; and (2) notified that he or she may decline to receive medical services by telemedicine and may withdraw from such care at any time.    Notes:   Subjective:       Patient ID: Christopher Webber is a 27 y.o. female.    Chief Complaint: No chief complaint on file.    HPI    Ms Webber presents today for follow up. She has history of anxiety and has taken xanax in the past as needed.      She would like a refill on this medication     We have discussed adhd   Strattera did not help however informed patient she should take a higher dose  Difficulty focusing   Difficulty completing projects      She will find a provider that will be able to refill stimulant medication if that is needed  She has no other concerns today   Review of Systems   HENT: Negative.     Endocrine: Negative.    Genitourinary: Negative.    Musculoskeletal:  Positive for arthralgias and neck pain.   Skin: Negative.    Neurological: Negative.    Psychiatric/Behavioral:  The patient is nervous/anxious.          Past Medical History:   Diagnosis Date    Anxiety state     Depression, major,  recurrent, mild 2020    Essential hypertension 09/10/2019    History of Narcolepsy     10 years ago     Past Surgical History:   Procedure Laterality Date     SECTION      TONSILLECTOMY       Family History   Problem Relation Age of Onset    Breast cancer Neg Hx     Cancer Neg Hx     Colon cancer Neg Hx     Diabetes Neg Hx     Eclampsia Neg Hx     Hypertension Neg Hx     Miscarriages / Stillbirths Neg Hx      labor Neg Hx     Ovarian cancer Neg Hx     Stroke Neg Hx      Social History     Socioeconomic History    Marital status: Single   Tobacco Use    Smoking status: Never    Smokeless tobacco: Never   Substance and Sexual Activity    Alcohol use: Not Currently     Alcohol/week: 0.0 standard drinks     Comment: social    Drug use: No    Sexual activity: Yes     Partners: Male     Birth control/protection: Condom, None       Objective:        Physical Exam  Vitals reviewed.   Constitutional:       Appearance: Normal appearance.   HENT:      Head: Normocephalic and atraumatic.   Pulmonary:      Effort: Pulmonary effort is normal.   Neurological:      Mental Status: She is alert.   Psychiatric:         Mood and Affect: Mood normal.         Results for orders placed or performed in visit on 22   IGP,rfx Aptima HPV all pth   Result Value Ref Range    Result         Assessment/Plan:     Neck pain  -     cyclobenzaprine (FLEXERIL) 10 MG tablet; TAKE 1 TABLET(10 MG) BY MOUTH THREE TIMES DAILY AS NEEDED FOR MUSCLE SPASMS  Dispense: 40 tablet; Refill: 1    Low back pain at multiple sites  -     cyclobenzaprine (FLEXERIL) 10 MG tablet; TAKE 1 TABLET(10 MG) BY MOUTH THREE TIMES DAILY AS NEEDED FOR MUSCLE SPASMS  Dispense: 40 tablet; Refill: 1    Sickle cell trait  -     cyclobenzaprine (FLEXERIL) 10 MG tablet; TAKE 1 TABLET(10 MG) BY MOUTH THREE TIMES DAILY AS NEEDED FOR MUSCLE SPASMS  Dispense: 40 tablet; Refill: 1          Follow up 3 months sooner if needed    Chantel Bowen MD  ON   Family  Medicine

## 2023-07-29 ENCOUNTER — PATIENT MESSAGE (OUTPATIENT)
Dept: ADMINISTRATIVE | Facility: OTHER | Age: 28
End: 2023-07-29
Payer: MEDICAID

## 2024-02-18 ENCOUNTER — PATIENT MESSAGE (OUTPATIENT)
Dept: DERMATOLOGY | Facility: CLINIC | Age: 29
End: 2024-02-18
Payer: MEDICAID

## 2024-03-06 ENCOUNTER — PATIENT MESSAGE (OUTPATIENT)
Dept: DERMATOLOGY | Facility: CLINIC | Age: 29
End: 2024-03-06
Payer: MEDICAID

## 2024-06-10 DIAGNOSIS — M62.81 WEAKNESS OF TRUNK MUSCULATURE: Primary | ICD-10-CM

## 2024-06-17 ENCOUNTER — CLINICAL SUPPORT (OUTPATIENT)
Dept: REHABILITATION | Facility: HOSPITAL | Age: 29
End: 2024-06-17
Payer: MEDICAID

## 2024-06-17 DIAGNOSIS — M62.81 WEAKNESS OF TRUNK MUSCULATURE: Primary | ICD-10-CM

## 2024-06-17 PROCEDURE — 97161 PT EVAL LOW COMPLEX 20 MIN: CPT | Mod: PN

## 2024-06-17 PROCEDURE — 97110 THERAPEUTIC EXERCISES: CPT | Mod: PN

## 2024-06-17 NOTE — PLAN OF CARE
"[facility]   Pelvic Health Physical Therapy Initial Evaluation     Date: 2024   Name: Christopher Webber  Clinic Number: [id number]    Therapy Diagnosis:   Encounter Diagnosis   Name Primary?    Weakness of trunk musculature Yes     Physician: Ines Almaraz    Physician Orders: PT Eval and Treat   Medical Diagnosis from Referral: Weakness of trunk musculature [M62.81]   Evaluation Date: 2024  Authorization Period Expiration: 6/10/2025  Plan of Care Expiration: 2024  Progress Note Due: 2024  Visit # / Visits authorized:  eval   FOTO: Issued Visit #: 1 /3    Precautions: Standard    Time In: 2:50  Time Out: 3:30  Total Appointment Time (timed & untimed codes): 40 minutes    SUBJECTIVE     Date of onset:     History of current condition - Christopher reports: 2  deliveries 6 years ago and at the beginning of this year. She has pain on and near the scar. The pain is aggravated with tight clothing and some palpation. She describes the pain as "uncomfortable" and rates it a 6/10 with tight clothing.     OB/GYN History: caesarean  Sexually active? Yes  Pain with vaginal exams, intercourse or tampon use? 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  Prior Level of Function: Pt was independent with all ADLs and iADLs without pain, no reports of incontinence of bowel or bladder.  Current Level of Function: Unable to wear tight-fitted clothing due to pain      Patients goals: decrease  scar pain   "   Medical History: Christopher  has a past medical history of Anxiety state, Depression, major, recurrent, mild (2020), History of fetal demise, not currently pregnant (2024), History of Narcolepsy, and Preeclampsia (2019).     Surgical History: Christopher Webber  has a past surgical history that includes  section and Tonsillectomy.    Medications: Christopher has a current medication list which includes the following prescription(s): slynd, labetalol, nifedipine, ondansetron, oxycodone-acetaminophen, and tranexamic acid.    Allergies:   Review of patient's allergies indicates:   Allergen Reactions    Asa [aspirin] Other (See Comments)     IBS         OBJECTIVE     ORTHO SCREEN  Posture in sitting: slouched   Posture in standing: increased lordosis  Pelvic alignment: no sign of deviations noted in supine   Adductor Palpation: Not assessed today     ABDOMINAL WALL ASSESSMENT  Palpation: increased tension  and hypertonic  Abdominal strength:Transverse abdominus: fair   Scarring:  scar assessed, minimal scar tissue noted  Diastasis: absent with curl up test      BREATHING MECHANICS ASSESSMENT   Thorax Assessment During Deep Respiration: Decreased excursion of abdominal wall       Limitation/Restriction for FOTO Pelvic Floor Survey    Therapist reviewed FOTO scores for Christopher Webber on 2024.   FOTO documents entered into "[employer] - see Media section.    Limitation Score: PFDI Pain 17       TREATMENT     Total Treatment time (time-based codes) separate from Evaluation: 25 minutes       Manual Therapy to improve flexibility, extensibility, and desensitization for 15 minutes including:   Scar mobilization and cupping to suprapubic region    Therapeutic Activity to improve dynamic functional performance for 10 minutes including:    Reviewed HEP:  Diaphragmatic breathing  TA contraction in supine  Cat cows  [employer]books      PATIENT EDUCATION AND HOME EXERCISES     Education provided:   general  anatomy/physiology of urinary/ bowel  system, benefits of treatment, and alternative methods of treatment were discussed with the patient. Additionally, anatomy/physiology of pelvic floor, posture/body mechanices, diaphragmatic breathing, isometric abdominal exercises, behavior modifications, and scar mobilization were reviewed.     Written Home Exercises provided: yes.  Exercises were reviewed and Christopher was able to demonstrate them prior to the end of the session. Christopher demonstrated good  understanding of the education provided. See EMR under Patient Instructions for exercises provided during therapy sessions.    ASSESSMENT     Christopher is a 28 y.o. female referred to outpatient Physical Therapy with a medical diagnosis of Weakness of trunk musculature [M62.81]. Pt presents with altered posture, poor knowledge of body mechanics and posture, adhered abdominal scar, and poor trunk stability.       Patient prognosis is Good.   Patient will benefit from skilled outpatient Physical Therapy to address the deficits stated above and in the chart below, provide patient/family education, and to maximize patient's level of independence.     Plan of care discussed with patient: Yes  Patient's spiritual, cultural and educational needs considered and patient is agreeable to the plan of care and goals as stated below:     Anticipated Barriers for therapy: none    Medical Necessity is demonstrated by the following:    History  Co-morbidities and personal factors that may impact the plan of care [x] LOW: no personal factors / co-morbidities  [] MODERATE: 1-2 personal factors / co-morbidities  [] HIGH: 3+ personal factors / co-morbidities    Moderate / High Support Documentation:   Co-morbidities affecting plan of care: n/a    Personal Factors:   no deficits     Examination  Body Structures and Functions, activity limitations and participation restrictions that may impact the plan of care [x] LOW: addressing 1-2 elements  []  MODERATE: 3+ elements  [] HIGH: 4+ elements (please support below)    Moderate / High Support Documentation: n/a     Clinical Presentation [x] LOW: stable  [] MODERATE: Evolving  [] HIGH: Unstable     Decision Making/ Complexity Score: low        Goals:  Short Term Goals: 6 weeks   - Pt will demonstrate excellent knowledge and adherence to HEP to facilitate optimal recovery.  - Pt will demonstrate proper PFM contraction, relaxation, and lengthening coordinated with TA and breath for improved muscle coordination needed for functional activity.  - Pt will demonstrate proper Diaphragmatic breathing to help with calming the nervous system in order to improve abdominal wall and PF musculature extensibility for decreased pain.     Long Term Goals: 10 weeks   - Pt will demonstrate excellent knowledge and adherence to HEP for continued self-maintenance of symptoms.  - Pt to report a decrease in pain to no more than 3/10 at it's worst when wearing tight clothing over  scar.    - Pt will report FOTO score of 8% limited or less indicating clinically relevant increase in function.  - Pt to demonstrate proper body mechanics and transverse abdominus activation with lifting, transfers, and other functional mobility to decrease strain on lumbopelvic and abdominal structures with ADLs.    PLAN     Plan of care Certification: 2024 to 2024.    Outpatient Physical Therapy 1 time(s) every week(s) for 10 weeks to include the following interventions: Cervical/Lumbar Traction, Electrical Stimulation TENS/IFC, Manual Therapy, Moist Heat/ Ice, Neuromuscular Re-ed, Patient Education, Self Care, Therapeutic Activities, Therapeutic Exercise, and ASTYM, and FDN.     Henrry Pineda, PT      I CERTIFY THE NEED FOR THESE SERVICES FURNISHED UNDER THIS PLAN OF TREATMENT AND WHILE UNDER MY CARE   Physician's comments:     Physician's Signature: ___________________________________________________

## 2024-06-17 NOTE — PROGRESS NOTES
"[facility]   Pelvic Health Physical Therapy Initial Evaluation     Date: 2024   Name: Christopher Webber  Clinic Number: [id number]    Therapy Diagnosis:   Encounter Diagnosis   Name Primary?    Weakness of trunk musculature Yes     Physician: Ines Almaraz    Physician Orders: PT Eval and Treat   Medical Diagnosis from Referral: Weakness of trunk musculature [M62.81]   Evaluation Date: 2024  Authorization Period Expiration: 6/10/2025  Plan of Care Expiration: 2024  Progress Note Due: 2024  Visit # / Visits authorized:  eval   FOTO: Issued Visit #: 1 /3    Precautions: Standard    Time In: 2:50  Time Out: 3:30  Total Appointment Time (timed & untimed codes): 40 minutes    SUBJECTIVE     Date of onset:     History of current condition - Christopher reports: 2  deliveries 6 years ago and at the beginning of this year. She has pain on and near the scar. The pain is aggravated with tight clothing and some palpation. She describes the pain as "uncomfortable" and rates it a 6/10 with tight clothing.     OB/GYN History: caesarean  Sexually active? Yes  Pain with vaginal exams, intercourse or tampon use? 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  Prior Level of Function: Pt was independent with all ADLs and iADLs without pain, no reports of incontinence of bowel or bladder.  Current Level of Function: Unable to wear tight-fitted clothing due to pain      Patients goals: decrease  scar pain   "   Medical History: Christopher  has a past medical history of Anxiety state, Depression, major, recurrent, mild (2020), History of fetal demise, not currently pregnant (2024), History of Narcolepsy, and Preeclampsia (2019).     Surgical History: Christopher Webber  has a past surgical history that includes  section and Tonsillectomy.    Medications: Christopher has a current medication list which includes the following prescription(s): slynd, labetalol, nifedipine, ondansetron, oxycodone-acetaminophen, and tranexamic acid.    Allergies:   Review of patient's allergies indicates:   Allergen Reactions    Asa [aspirin] Other (See Comments)     IBS         OBJECTIVE     ORTHO SCREEN  Posture in sitting: slouched   Posture in standing: increased lordosis  Pelvic alignment: no sign of deviations noted in supine   Adductor Palpation: Not assessed today     ABDOMINAL WALL ASSESSMENT  Palpation: increased tension  and hypertonic  Abdominal strength:Transverse abdominus: fair   Scarring:  scar assessed, minimal scar tissue noted  Diastasis: absent with curl up test      BREATHING MECHANICS ASSESSMENT   Thorax Assessment During Deep Respiration: Decreased excursion of abdominal wall       Limitation/Restriction for FOTO Pelvic Floor Survey    Therapist reviewed FOTO scores for Christopher Webber on 2024.   FOTO documents entered into [facility] - see Media section.    Limitation Score: PFDI Pain 17       TREATMENT     Total Treatment time (time-based codes) separate from Evaluation: 25 minutes       Manual Therapy to improve flexibility, extensibility, and desensitization for 15 minutes including:   Scar mobilization and cupping to suprapubic region    Therapeutic Activity to improve dynamic functional performance for 10 minutes including:    Reviewed HEP:  Diaphragmatic breathing  TA contraction in supine  Cat cows  Openbooks      PATIENT EDUCATION AND HOME EXERCISES     Education provided:   general  anatomy/physiology of urinary/ bowel  system, benefits of treatment, and alternative methods of treatment were discussed with the patient. Additionally, anatomy/physiology of pelvic floor, posture/body mechanices, diaphragmatic breathing, isometric abdominal exercises, behavior modifications, and scar mobilization  were reviewed.     Written Home Exercises provided: yes.  Exercises were reviewed and Christopher was able to demonstrate them prior to the end of the session. Christopher demonstrated good  understanding of the education provided. See EMR under Patient Instructions for exercises provided during therapy sessions.    ASSESSMENT     Christopher is a 28 y.o. female referred to outpatient Physical Therapy with a medical diagnosis of Weakness of trunk musculature [M62.81]. Pt presents with altered posture, poor knowledge of body mechanics and posture, adhered abdominal scar, and poor trunk stability.       Patient prognosis is Good.   Patient will benefit from skilled outpatient Physical Therapy to address the deficits stated above and in the chart below, provide patient/family education, and to maximize patient's level of independence.     Plan of care discussed with patient: Yes  Patient's spiritual, cultural and educational needs considered and patient is agreeable to the plan of care and goals as stated below:     Anticipated Barriers for therapy: none    Medical Necessity is demonstrated by the following:    History  Co-morbidities and personal factors that may impact the plan of care [x] LOW: no personal factors / co-morbidities  [] MODERATE: 1-2 personal factors / co-morbidities  [] HIGH: 3+ personal factors / co-morbidities    Moderate / High Support Documentation:   Co-morbidities affecting plan of care: n/a    Personal Factors:   no deficits     Examination  Body Structures and Functions, activity limitations and participation restrictions that may impact the plan of care [x] LOW: addressing 1-2 elements  []  MODERATE: 3+ elements  [] HIGH: 4+ elements (please support below)    Moderate / High Support Documentation: n/a     Clinical Presentation [x] LOW: stable  [] MODERATE: Evolving  [] HIGH: Unstable     Decision Making/ Complexity Score: low        Goals:  Short Term Goals: 6 weeks   - Pt will demonstrate excellent knowledge and adherence to HEP to facilitate optimal recovery.  - Pt will demonstrate proper PFM contraction, relaxation, and lengthening coordinated with TA and breath for improved muscle coordination needed for functional activity.  - Pt will demonstrate proper Diaphragmatic breathing to help with calming the nervous system in order to improve abdominal wall and PF musculature extensibility for decreased pain.     Long Term Goals: 10 weeks   - Pt will demonstrate excellent knowledge and adherence to HEP for continued self-maintenance of symptoms.  - Pt to report a decrease in pain to no more than 3/10 at it's worst when wearing tight clothing over  scar.    - Pt will report FOTO score of 8% limited or less indicating clinically relevant increase in function.  - Pt to demonstrate proper body mechanics and transverse abdominus activation with lifting, transfers, and other functional mobility to decrease strain on lumbopelvic and abdominal structures with ADLs.    PLAN     Plan of care Certification: 2024 to 2024.    Outpatient Physical Therapy 1 time(s) every week(s) for 10 weeks to include the following interventions: Cervical/Lumbar Traction, Electrical Stimulation TENS/IFC, Manual Therapy, Moist Heat/ Ice, Neuromuscular Re-ed, Patient Education, Self Care, Therapeutic Activities, Therapeutic Exercise, and ASTYM, and FDN .     Henrry Pineda, PT      I CERTIFY THE NEED FOR THESE SERVICES FURNISHED UNDER THIS PLAN OF TREATMENT AND WHILE UNDER MY CARE   Physician's comments:     Physician's Signature: ___________________________________________________

## 2024-06-17 NOTE — PATIENT INSTRUCTIONS
Healing Your  Scar  Any incision disrupts the layers of skin, muscle, and fascia (important connective tissue that ties muscle, skin, and organs together), and working on the scar can help make sure the healing process goes well. The belly is an important area of the body for good lower back and pelvic floor function, so rehabilitating your  scar is important!  Your body heals from an incision in stages. In the first few weeks, your body focuses on closing up the area, making lots of scar tissue. Over the next 1-2 years, your body remodels that scar to reduce any unnecessary tissue. During this time, tiny nerves in the skin are growing back to each other (which can feel sensitive) and the muscles in the area are building back up.    Scar Desensitization (start at 2-4 weeks postpartum)  Your scar might feel sensitive or tingly, either feeling uncomfortable to touch or to have clothes/sheets touch it. Progressively getting your scar used to being touched can help reduce this feeling.   Start with something very soft, like silk or satin, and gently pass it over the scar. It should be no more than mildly uncomfortable. After a few times, that will likely feel much less sensitive, and you're ready to progress! Work your way up from silk/satin, to a soft cotton T-shirt, to a rougher wash rag (or whatever your have available).   Doing this teaches your nervous system to pay less attention to the scar and should improve the sensitivity over time.    Scar Massage (start at 6-8 weeks postpartum, the scar should be well-healed)  After your scar is more healed, you can start massaging it. This can help further reduce sensitivity and work on the layers of skin, muscle, and fascia trapped under the scar. These layers need to be able to move and glide across each other, so performing some massage with movement can help this process. Scar mobilization has been shown to improve the viscoelasticity (read:  stretchiness) and pain threshold of  scars.  Gently massage over the  scar with soap/moisturizer in the shower, or with lotion. It should be no more than mildly uncomfortable, and you should stop if you notice any worsening symptoms or changes with your scar (also, contact your provider). Start with just gentle circles, and progress to more aggressive techniques like pinch & lift. Perform for 3-5 minutes per day, and reduce to every couple days or every couple weeks if you notice improvements.        Rene OQUENDO, Dirk POWER, Olivia OQUENDO. Exploring the Effects of Standardized Soft Tissue Mobilization on the Viscoelastic Properties, Pressure Pain Thresholds, and Tactile Pressure Thresholds of the  Section Scar. J Integr Complement Med. 2022;28(4):355-362.

## 2024-06-25 ENCOUNTER — TELEPHONE (OUTPATIENT)
Dept: FAMILY MEDICINE | Facility: CLINIC | Age: 29
End: 2024-06-25
Payer: MEDICAID

## 2024-06-25 NOTE — TELEPHONE ENCOUNTER
----- Message from Ann Estevez sent at 6/25/2024  9:07 AM CDT -----  Contact: [phone]  Pt has Medicaid and states her PCP was Dr Bowen. She would like to call to discuss if she can schedule now with Dr Romano? Can you please giver her a call? Thanks

## 2024-06-25 NOTE — TELEPHONE ENCOUNTER
Spoke to pt and informed her that Dr. Romano is not taking any new Medicaid patients at this time. She verbalized understanding.

## 2024-07-01 ENCOUNTER — CLINICAL SUPPORT (OUTPATIENT)
Dept: REHABILITATION | Facility: HOSPITAL | Age: 29
End: 2024-07-01
Payer: MEDICAID

## 2024-07-01 DIAGNOSIS — M62.81 WEAKNESS OF TRUNK MUSCULATURE: Primary | ICD-10-CM

## 2024-07-01 PROCEDURE — 97110 THERAPEUTIC EXERCISES: CPT | Mod: PN

## 2024-07-01 NOTE — PROGRESS NOTES
"  Pelvic Health Physical Therapy   Treatment Note     Name: hCristopher Webber  [facility] Number: [id number]    Therapy Diagnosis:   Encounter Diagnosis   Name Primary?    Weakness of trunk musculature Yes     Physician: Ines Almaraz    Visit Date: 2024    Physician Orders: PT Eval and Treat   Medical Diagnosis from Referral: Weakness of trunk musculature [M62.81]   Evaluation Date: 2024  Authorization Period Expiration: 6/10/2025  Plan of Care Expiration: 2024  Progress Note Due: 2024  Visit # / Visits authorized: 1/10 + eval   FOTO: Issued Visit #: 1 /3     Precautions: Standard    Time In: 3:50  Time Out: 4:20  Total Billable Time: 30 minutes    Subjective     Pt reports: continued numbness above  site. Would like to work on core strength.   She was somewhat compliant with home exercise program.  Response to previous treatment: 1st treatment since initial evaluation  Functional change: no change     Pain: 0/10  Location: generalized     Objective     Objective Measures updated at progress report unless specified.     Treatment     Christopher received therapeutic exercises to develop  strength, endurance, ROM, flexibility, posture, and core stabilization for 20 minutes including:     TA contraction in supine  Bird dogs - cues to decrease pelvic rotation  Bear planks 10" hold x5   Modified side plank 2x15" each   Quadruped fire hydrant     Not today:  Cat cows  Openbooks    Christopher received the following manual therapy techniques: to develop flexibility, extensibility, and desensitization for 10 minutes including: scar mobilization and cupping of  site      Home Exercises Provided and Patient Education Provided     Education provided:   - anatomy/physiology of pelvic floor, posture/body mechanices, diaphragmatic breathing, isometric abdominal exercises, and scar mobilization  Discussed progression of plan of care with patient; educated pt in activity modification; reviewed HEP " with pt. Pt demonstrated and verbalized understanding of all instruction and was provided with a handout of HEP (see Patient Instructions).  - HEP provided.     Written Home Exercises Provided: Patient instructed to cont prior HEP.  Exercises were reviewed and Christopher was able to demonstrate them prior to the end of the session.  Christopher demonstrated good  understanding of the education provided.     See EMR under Patient Instructions for exercises provided prior visit.    Assessment     Patient tolerated therapy session well without complaints. Scar mobilization and cupping performed on  site. Encouraged patient to perform scar mobilizations at home. Introduced beginner to intermediate core strengthening interventions to tolerance. Education on importance of breathing and core stabilization.   Christopher Is progressing well towards her goals.   Pt prognosis is Good.     Pt will continue to benefit from skilled outpatient physical therapy to address the deficits listed in the problem list box on initial evaluation, provide pt/family education and to maximize pt's level of independence in the home and community environment.     Pt's spiritual, cultural and educational needs considered and pt agreeable to plan of care and goals.     Anticipated barriers to physical therapy: none    Goals:  Short Term Goals: 6 weeks   - Pt will demonstrate excellent knowledge and adherence to HEP to facilitate optimal recovery.  - Pt will demonstrate proper PFM contraction, relaxation, and lengthening coordinated with TA and breath for improved muscle coordination needed for functional activity.  - Pt will demonstrate proper Diaphragmatic breathing to help with calming the nervous system in order to improve abdominal wall and PF musculature extensibility for decreased pain.      Long Term Goals: 10 weeks   - Pt will demonstrate excellent knowledge and adherence to HEP for continued self-maintenance of symptoms.  - Pt to report a  decrease in pain to no more than 3/10 at it's worst when wearing tight clothing over  scar.    - Pt will report FOTO score of 8% limited or less indicating clinically relevant increase in function.  - Pt to demonstrate proper body mechanics and transverse abdominus activation with lifting, transfers, and other functional mobility to decrease strain on lumbopelvic and abdominal structures with ADLs.     PLAN      Plan of care Certification: 2024 to 2024.     Outpatient Physical Therapy 1 time(s) every week(s) for 10 weeks to include the following interventions: Cervical/Lumbar Traction, Electrical Stimulation TENS/IFC, Manual Therapy, Moist Heat/ Ice, Neuromuscular Re-ed, Patient Education, Self Care, Therapeutic Activities, Therapeutic Exercise, and ASTYM, and FDN.        Henrry Pineda, PT

## 2025-03-31 ENCOUNTER — ON-DEMAND VIRTUAL (OUTPATIENT)
Dept: URGENT CARE | Facility: CLINIC | Age: 30
End: 2025-03-31
Payer: MEDICAID

## 2025-03-31 DIAGNOSIS — T36.95XA ANTIBIOTIC-INDUCED YEAST INFECTION: ICD-10-CM

## 2025-03-31 DIAGNOSIS — B96.89 BACTERIAL VAGINITIS: Primary | ICD-10-CM

## 2025-03-31 DIAGNOSIS — B37.9 ANTIBIOTIC-INDUCED YEAST INFECTION: ICD-10-CM

## 2025-03-31 DIAGNOSIS — N76.0 BACTERIAL VAGINITIS: Primary | ICD-10-CM

## 2025-03-31 PROCEDURE — 98004 SYNCH AUDIO-VIDEO EST SF 10: CPT | Mod: 95,,,

## 2025-03-31 RX ORDER — FLUCONAZOLE 150 MG/1
150 TABLET ORAL DAILY
Qty: 2 TABLET | Refills: 0 | Status: SHIPPED | OUTPATIENT
Start: 2025-03-31 | End: 2025-04-02

## 2025-03-31 RX ORDER — METRONIDAZOLE 500 MG/1
500 TABLET ORAL EVERY 12 HOURS
Qty: 14 TABLET | Refills: 0 | Status: SHIPPED | OUTPATIENT
Start: 2025-03-31 | End: 2025-04-07

## 2025-03-31 NOTE — PROGRESS NOTES
Subjective:      Patient ID: Christopher Webber is a 29 y.o. female.    Vitals:  vitals were not taken for this visit.     Chief Complaint: Vaginal Discharge      Visit Type: TELE AUDIOVISUAL - This visit was conducted virtually based on  subjective information and limited objective exam    Present with the patient at the time of consultation: TELEMED PRESENT WITH PATIENT: None  LOCATION OF PATIENT [place], [place]  Two patient identifiers used to verify patient- saying out date of birth and full name.       Past Medical History:   Diagnosis Date    Anxiety state     Depression, major, recurrent, mild 2020    History of fetal demise, not currently pregnant 2024    History of Narcolepsy     10 years ago    Preeclampsia      Past Surgical History:   Procedure Laterality Date     SECTION      TONSILLECTOMY       Review of patient's allergies indicates:   Allergen Reactions    Asa [aspirin] Other (See Comments)     IBS      Medications Ordered Prior to Encounter[1]  Family History   Problem Relation Name Age of Onset    Breast cancer Neg Hx      Cancer Neg Hx      Colon cancer Neg Hx      Diabetes Neg Hx      Eclampsia Neg Hx      Hypertension Neg Hx      Miscarriages / Stillbirths Neg Hx       labor Neg Hx      Ovarian cancer Neg Hx      Stroke Neg Hx         Medications Ordered                [id number] - [place] -  [address] AT [facility]    [address], [facility] [address]    Telephone: [phone]   Fax: [phone]   Hours: Not open 24 hours                         E-Prescribed (2 of 2)              fluconazole (DIFLUCAN) 150 MG Tab    Sig: Take 1 tablet (150 mg total) by mouth once daily. for 2 doses       Start: 3/31/25     Quantity: 2 tablet Refills: 0                         metroNIDAZOLE (FLAGYL) 500 MG tablet    Sig: Take 1 tablet (500 mg total) by mouth every 12 (twelve) hours. for 7 days       Start: 3/31/25     Quantity:  14 tablet Refills: 0                           Ohs Peq Odvv Intake    3/31/2025  4:07 PM CDT - Filed by Patient   What is your current physical address in the event of a medical emergency? [address] [place] [address]   Are you able to take your vital signs? No   Please attach any relevant images or files    Is your employer contracted with [facility]? No         28 yo female c/o vaginal odor and clear white vaginal discharge for the past couple of days.   Similar to BV infection in the past, last episode was 9 years ago.   Denies fever, chills, N/V, vaginal itching, dysuria, hematuria, abd/pelvic pain.           Constitution: Negative for chills, fatigue, fever and generalized weakness.   Gastrointestinal:  Negative for abdominal pain.   Genitourinary:  Positive for vaginal discharge and vaginal odor. Negative for dysuria, frequency, urgency, flank pain, hematuria, vaginal pain, genital sore and pelvic pain.        Objective:   The physical exam was conducted virtually.    AAO x 3 ; no acute distress noted; appearance normal; mood and behavior normal; thought process normal  Head- normocephalic  Nose- appears normal, no discharge or erythema  Eyes- pupils appear normal in size, no drainage, no erythema  Ears- normal appearing; no discharge, no erythema  Mouth- appears normal  Lungs- breathing at a normal rate, no acute distress noted  Heart- no reports of tachycardia, palpitations, chest pain  Abdomen- non distended, non tender reported by patient  Skin- warm and dry, no erythema or edema noted by patient or visualized  Psych- as above; no si/hi      Assessment:     1. Bacterial vaginitis    2. Antibiotic-induced yeast infection        Plan:         Thank you for choosing [facility]!    Our goal in the [facility] is to always provide outstanding medical care. You may receive a survey by mail or e-mail in the next week regarding your experience today.  We would greatly appreciate you completing and returning the survey. Your feedback provides us with a way to recognize our staff who provide very good care, and it helps us learn how to improve when your experience was below our aspiration of excellence.         We appreciate you trusting us with your medical care. We hope you feel better soon. We will be happy to take care of you for all of your future medical needs.    You must understand that you've received an Urgent Care treatment only and that you may be released before all your medical problems are known or treated. You, the patient, will arrange for follow up care as instructed.    Follow up with your PCP or specialty clinic as directed in the next 1-2 weeks if not improved or as needed.  You can call [phone] to schedule an appointment with the appropriate provider.    If your condition worsens we recommend that you receive another evaluation in person, with your primary care provider, urgent care or at the emergency room immediately or contact your primary medical clinics after hours call service to discuss your concerns.         Bacterial vaginitis  -     metroNIDAZOLE (FLAGYL) 500 MG tablet; Take 1 tablet (500 mg total) by mouth every 12 (twelve) hours. for 7 days  Dispense: 14 tablet; Refill: 0    Antibiotic-induced yeast infection  -     fluconazole (DIFLUCAN) 150 MG Tab; Take 1 tablet (150 mg total) by mouth once daily. for 2 doses  Dispense: 2 tablet; Refill: 0                         [1]   Current Outpatient Medications on File Prior to Visit   Medication Sig Dispense Refill    drospirenone, contraceptive, (SLYND) 4 mg (28) Tab Take 1 tablet (4 mg total) by mouth once daily. 90 tablet 3    labetaloL (NORMODYNE) 200 MG tablet Take 1 tablet (200 mg total) by mouth 3 (three) times daily. 90 tablet 1    NIFEdipine (PROCARDIA-XL) 90 MG (OSM) 24 hr tablet Take 1 tablet (90 mg total) by mouth once daily. 30 tablet 1    ondansetron (ZOFRAN-ODT) 4 MG  TbDL Take 1 tablet (4 mg total) by mouth every 6 (six) hours as needed (nausea). 30 tablet 0    oxyCODONE-acetaminophen (PERCOCET) 7.5-325 mg per tablet Take 1 tablet by mouth every 6 (six) hours as needed for Pain. 15 tablet 0    tranexamic acid (LYSTEDA) 650 mg tablet Take 2 tablets (1,300 mg total) by mouth 3 (three) times daily. 30 tablet 0     No current facility-administered medications on file prior to visit.

## 2025-04-22 ENCOUNTER — PATIENT MESSAGE (OUTPATIENT)
Dept: PEDIATRICS | Facility: CLINIC | Age: 30
End: 2025-04-22
Payer: COMMERCIAL

## 2025-05-07 ENCOUNTER — TELEPHONE (OUTPATIENT)
Dept: OBSTETRICS AND GYNECOLOGY | Facility: CLINIC | Age: 30
End: 2025-05-07
Payer: COMMERCIAL

## 2025-05-07 ENCOUNTER — PATIENT MESSAGE (OUTPATIENT)
Dept: MATERNAL FETAL MEDICINE | Facility: CLINIC | Age: 30
End: 2025-05-07
Payer: COMMERCIAL

## 2025-05-07 ENCOUNTER — OFFICE VISIT (OUTPATIENT)
Dept: OBSTETRICS AND GYNECOLOGY | Facility: CLINIC | Age: 30
End: 2025-05-07
Payer: COMMERCIAL

## 2025-05-07 VITALS
DIASTOLIC BLOOD PRESSURE: 92 MMHG | SYSTOLIC BLOOD PRESSURE: 124 MMHG | WEIGHT: 151.88 LBS | HEIGHT: 62 IN | BODY MASS INDEX: 27.95 KG/M2

## 2025-05-07 DIAGNOSIS — Z87.59 HISTORY OF POOR PREGNANCY OUTCOME: Primary | ICD-10-CM

## 2025-05-07 PROCEDURE — 3074F SYST BP LT 130 MM HG: CPT | Mod: CPTII,S$GLB,, | Performed by: STUDENT IN AN ORGANIZED HEALTH CARE EDUCATION/TRAINING PROGRAM

## 2025-05-07 PROCEDURE — 99213 OFFICE O/P EST LOW 20 MIN: CPT | Mod: S$GLB,,, | Performed by: STUDENT IN AN ORGANIZED HEALTH CARE EDUCATION/TRAINING PROGRAM

## 2025-05-07 PROCEDURE — 4010F ACE/ARB THERAPY RXD/TAKEN: CPT | Mod: CPTII,S$GLB,, | Performed by: STUDENT IN AN ORGANIZED HEALTH CARE EDUCATION/TRAINING PROGRAM

## 2025-05-07 PROCEDURE — 3008F BODY MASS INDEX DOCD: CPT | Mod: CPTII,S$GLB,, | Performed by: STUDENT IN AN ORGANIZED HEALTH CARE EDUCATION/TRAINING PROGRAM

## 2025-05-07 PROCEDURE — 3080F DIAST BP >= 90 MM HG: CPT | Mod: CPTII,S$GLB,, | Performed by: STUDENT IN AN ORGANIZED HEALTH CARE EDUCATION/TRAINING PROGRAM

## 2025-05-07 PROCEDURE — 1159F MED LIST DOCD IN RCRD: CPT | Mod: CPTII,S$GLB,, | Performed by: STUDENT IN AN ORGANIZED HEALTH CARE EDUCATION/TRAINING PROGRAM

## 2025-05-07 PROCEDURE — 99999 PR PBB SHADOW E&M-EST. PATIENT-LVL III: CPT | Mod: PBBFAC,,, | Performed by: STUDENT IN AN ORGANIZED HEALTH CARE EDUCATION/TRAINING PROGRAM

## 2025-05-07 RX ORDER — TRETINOIN 1 MG/G
1 CREAM TOPICAL NIGHTLY
COMMUNITY
Start: 2024-10-28 | End: 2025-10-28

## 2025-05-07 RX ORDER — CETIRIZINE HYDROCHLORIDE 10 MG/1
1 TABLET ORAL EVERY MORNING
COMMUNITY
Start: 2025-03-18 | End: 2026-03-18

## 2025-05-07 RX ORDER — ALPRAZOLAM 0.25 MG/1
0.25 TABLET ORAL DAILY PRN
COMMUNITY
Start: 2025-03-18 | End: 2026-03-18

## 2025-05-07 RX ORDER — LABETALOL 100 MG/1
TABLET, FILM COATED ORAL
COMMUNITY

## 2025-05-23 ENCOUNTER — PATIENT MESSAGE (OUTPATIENT)
Dept: MATERNAL FETAL MEDICINE | Facility: CLINIC | Age: 30
End: 2025-05-23
Payer: COMMERCIAL

## 2025-06-10 NOTE — PROGRESS NOTES
VIRTUAL VISIT TELEMEDICINE    The patient location is: remote location  [facility]: [facility]  [facility] Physician: Dr. Bernarda Blakely  The chief complaint leading to consultation is: hx of Preeclampsia/DIC with last pregnancy   Visit type: telemedicine with audio and visual using [employer] technology    Face to Face time with patient: about 35  minutes  About 60 minutes of total time spent on the encounter, which includes face to face time and non-face to face time preparing to see the patient (eg, review of tests), Obtaining and/or reviewing separately obtained history, Documenting clinical information in the electronic or other health record, Independently interpreting results (not separately reported) and communicating results to the patient/family/caregiver, or Care coordination (not separately reported).     Each patient to whom he or she provides medical services by telemedicine is:  (1) informed of the relationship between the physician and patient and the respective role of any other health care provider with respect to management of the patient; and (2) notified that he or she may decline to receive medical services by telemedicine and may withdraw from such care at any time.      MATERNAL-FETAL MEDICINE   CONSULT NOTE    Provider requesting consultation: Dr. Humera Briseno    SUBJECTIVE:     Ms. Christopher Webber is a 29 y.o.  female presents for pre conceptual counseling secondary to poor ob hx with fetal demise at 35 wks gestation/FGR/preeclampsia/DIC, abnormal fetal doppler     Meds:  Labetalol 200 mg bid   ASA 81 mg daily          Review of patient's allergies indicates:   Allergen Reactions    Asa [aspirin] Other (See Comments)     IBS        PMH:  Past Medical History:   Diagnosis Date    Anxiety state     Depression, major, recurrent, mild 2020    History of fetal demise, not currently pregnant 2024    History of Narcolepsy     10 years ago    Preeclampsia   "      PObHx:  OB History    Para Term  AB Living   3 2 0 2 0 2   SAB IAB Ectopic Multiple Live Births   0 0 0 1 2      # Outcome Date GA Lbr Juan Manuel/2nd Weight Sex Type Anes PTL Lv   3  24 36w6d   M CS-LTranv Gen N FD      Complications: Placental abruption, DIC (disseminated intravascular coagulation), Encounter for assessment for fetal demise   2A  04/10/18 30w1d   F CS-Unspec   DANIAL   2B  04/10/18 30w1d   F CS-LTranv   DANIAL   1                 PSH:  Past Surgical History:   Procedure Laterality Date     SECTION      TONSILLECTOMY         Family history:family history includes Hypertension in her father.    Social history: reports that she has never smoked. She has never used smokeless tobacco. She reports that she does not currently use alcohol. She reports that she does not use drugs.      Objective:     ASSESSMENT/PLAN:     29 y.o.  female pre pregnancy    Patient without complaints today     S/P extensive MFM consultation with Dr. Eleanor Guy at [facility] on 28    OB hx:  2018, Mono/Di twins, 30 wks delivery via C/S secondary to preeclampsia and NRFHR of Twin B  2. , 34 wks with FGR        -admitted at 35 wks for preeclampsia without severe features         -discharged home for outpatient care after initial hospitalization         -at 36 wks, patient reports low fluid and possible oligohydramnios         -36w 6d - called "on call" line and given reassurance about fetal movement         -Presented to hospital with Intrauterine fetal demise        -Repeat C/S after failed TOLAC             -concern for DIC due to decreased fibrinogen        -Pathology: 5-10% placental infarct and villous congestion, recent hemorrhage c/w placental abruption        -Work up of IUFD: antiphospholipid and thrombophilia work up reported as negative. ZEKE reported                                      Negative         -HSG (06-10-25) negative "     ----------------------------------------------------    Diagnosed with  pre-existing  HTN:  -Cardiologist Dr. Vianey Rivera         Renal vascular scan normal   -Meds: Labetalol 200 mg daily    Chronic Hypertension  Today I counseled the patient on maternal/fetal risks associated with CHTN during pregnancy. Risks include but not limited to fetal growth restriction, miscarriage, abruption, maternal end organ disease (renal failure, MI, and stroke),  delivery, development of superimposed preeclampsia, and eclampsia. She was counseled on the recommendations for blood pressure control, serial ultrasound for fetal growth assessment and  testing, and timing of delivery. I also counseled her on the recommendation for aspirin 81 mg daily which may decrease her risk of developing superimposed preeclampsia.     Recommendations for Primary OB Management during future pregnancy:   Initiate aspirin 81 mg daily at 12-16 weeks gestation for preeclampsia risk reduction  Medications: as per clinical status. Most common antihypertensive medication during pregnancy including Labetalol or Procardia  Baseline evaluation with primary OB:   24-hour urine protein or baseline P/C ratio, CMP, and CBC.  Maternal EKG  Maternal ophthalmic evaluation  Maternal echocardiogram if HTN has been long-standing or EKG is abnormal  Serial fetal growth ultrasounds every 4-6 weeks, beginning at 26-28 weeks during pregnancy   Continued close observation of patient's blood pressures. Avoid hypotension as this has been associated with uteroplacental insufficiency.  Recommend treatment to a goal blood pressure < 140/90  For women with evidence of end-organ damage (prior CVA, renal disease, etc) or co-morbid conditions (ie diabetes), a lower threshold may be recommended  Weekly antepartum testing at 32 weeks (NST+AFV); twice weekly testing if control is poor, multiple comorbidities are present, or requires several medications for control           (Recommend twice a week fetal testing starting at 32 wks gestation secondary to prior IUFD)    Delivery timing:  No medications, no comorbid conditions: 39 0/7 - 39 6/7 weeks gestation  No medications, comorbid conditions: 38 0/7 - 38 6/7 weeks gestation  Controlled on single agent, no comorbid conditions*: 38 0/7 - 38 6/7 weeks gestation  Controlled on single agent, comorbid conditions*: 37 0/7 - 38 6/7 weeks gestation  Uncontrolled or requiring >= 2 medications: 36 0/7 - 37 6/7 weeks gestation    *Comorbid conditions include BMI >= 40, diabetes, and complex medical condition associated with placental dysfunction (ie lupus or other vascular disease)  Delivery may be recommended earlier pending results of fetal growth ultrasounds, AFV assessment, or antepartum testing results.    We discussed various other topics including where she will receive prenatal care, MFM follow up during the pregnancy, and location of delivery. We discussed logistics within the [facility] system including where satellite clinics are and that MFM takes in patient call only at [facility]. Patient's questions were answered in detail.       FOLLOW UP:   -recommend see above conditions for specific recommendations       Bernarda Blakely  Maternal-Fetal Medicine    Electronically Signed by Bernarda Blakely Tina 10, 2025

## 2025-06-12 ENCOUNTER — OFFICE VISIT (OUTPATIENT)
Dept: MATERNAL FETAL MEDICINE | Facility: CLINIC | Age: 30
End: 2025-06-12
Attending: OBSTETRICS & GYNECOLOGY
Payer: COMMERCIAL

## 2025-06-12 DIAGNOSIS — Z87.59 HISTORY OF POOR PREGNANCY OUTCOME: ICD-10-CM

## 2025-06-12 PROCEDURE — 98003 SYNCH AUDIO-VIDEO NEW HI 60: CPT | Mod: 95,,, | Performed by: OBSTETRICS & GYNECOLOGY

## 2025-06-12 PROCEDURE — 4010F ACE/ARB THERAPY RXD/TAKEN: CPT | Mod: CPTII,95,, | Performed by: OBSTETRICS & GYNECOLOGY

## 2025-06-28 ENCOUNTER — ON-DEMAND VIRTUAL (OUTPATIENT)
Dept: URGENT CARE | Facility: CLINIC | Age: 30
End: 2025-06-28
Payer: COMMERCIAL

## 2025-06-28 DIAGNOSIS — R21 RASH: Primary | ICD-10-CM

## 2025-06-28 PROCEDURE — 98004 SYNCH AUDIO-VIDEO EST SF 10: CPT | Mod: 95,,, | Performed by: NURSE PRACTITIONER

## 2025-06-28 RX ORDER — KETOCONAZOLE 20 MG/G
CREAM TOPICAL DAILY
Qty: 30 G | Refills: 0 | Status: SHIPPED | OUTPATIENT
Start: 2025-06-28 | End: 2025-07-08

## 2025-06-28 NOTE — PROGRESS NOTES
Subjective:      Patient ID: Christopher Webber is a 29 y.o. female.    Vitals:  vitals were not taken for this visit.     Chief Complaint: Rash      Visit Type: TELE AUDIOVISUAL - This visit was conducted virtually based on  subjective information and limited objective exam    Present with the patient at the time of consultation: TELEMED PRESENT WITH PATIENT: None  LOCATION OF PATIENT [place], [place]   Two patient identifiers used to verify patient- saying out date of birth and full name.       Past Medical History:   Diagnosis Date    Anxiety state     Depression, major, recurrent, mild 2020    History of fetal demise, not currently pregnant 2024    History of Narcolepsy     10 years ago    Preeclampsia      Past Surgical History:   Procedure Laterality Date     SECTION      TONSILLECTOMY       Review of patient's allergies indicates:   Allergen Reactions    Asa [aspirin] Other (See Comments)     IBS      Medications Ordered Prior to Encounter[1]  Family History   Problem Relation Name Age of Onset    Hypertension Father      Breast cancer Neg Hx      Cancer Neg Hx      Colon cancer Neg Hx      Diabetes Neg Hx      Eclampsia Neg Hx      Miscarriages / Stillbirths Neg Hx       labor Neg Hx      Ovarian cancer Neg Hx      Stroke Neg Hx         Medications Ordered                [id number] [place] [facility] [place] [facility]  [address] AT [facility]    [address], [facility] [address]    Telephone: [phone]   Fax: [phone]   Hours: Not open 24 hours                         E-Prescribed (1 of 1)              ketoconazole (NIZORAL) 2 % cream    Sig: Apply topically once daily. for 10 days       Start: 25     Quantity: 30 g Refills: 0                           Ohs Peq Odvv Intake    2025  3:40 PM CDT - Filed by Patient   What is your current physical address in the event of a medical emergency? [address]   Are you able to take your  vital signs? No   Please attach any relevant images or files    Is your employer contracted with [facility] [employer]? No         Itchy rash to chin concerning for ringworm. Denies new soaps, lotions and detergents.No drainage, no fever. No sick contact.       ROS     Objective:   The physical exam was conducted virtually.    AAO x 3 ; no acute distress noted; appearance normal; mood and behavior normal; thought process normal  Head- normocephalic  Nose- appears normal, no discharge or erythema  Eyes- pupils appear normal in size, no drainage, no erythema  Ears- normal appearing; no discharge, no erythema  Mouth- appears normal  Oropharynx- no erythema, lesions  Lungs- breathing at a normal rate, no acute distress noted  Heart- no reports of tachycardia, palpitations, chest pain  Abdomen- non distended, non tender reported by patient  Skin- warm and dry, no erythema or edema noted by patient or visualized        Assessment:     1. Rash        Plan:         Thank you for choosing [facility]!    Our goal in the [facility] is to always provide outstanding medical care. You may receive a survey by mail or e-mail in the next week regarding your experience today. We would greatly appreciate you completing and returning the survey. Your feedback provides us with a way to recognize our staff who provide very good care, and it helps us learn how to improve when your experience was below our aspiration of excellence.         We appreciate you trusting us with your medical care. We hope you feel better soon. We will be happy to take care of you for all of your future medical needs.    You must understand that you've received an Urgent Care treatment only and that you may be released before all your medical problems are known or treated. You, the patient, will arrange for follow up care as instructed.    Follow up with your PCP or specialty clinic as directed in the next 1-2 weeks if not  improved or as needed.  You can call [phone] to schedule an appointment with the appropriate provider.    If your condition worsens we recommend that you receive another evaluation in person, with your primary care provider, urgent care or at the emergency room immediately or contact your primary medical clinics after hours call service to discuss your concerns.         Rash  -     ketoconazole (NIZORAL) 2 % cream; Apply topically once daily. for 10 days  Dispense: 30 g; Refill: 0                          [1]   Current Outpatient Medications on File Prior to Visit   Medication Sig Dispense Refill    ALPRAZolam (XANAX) 0.25 MG tablet Take 0.25 mg by mouth daily as needed.      cetirizine (ZYRTEC) 10 MG tablet Take 1 tablet by mouth every morning.      labetaloL (NORMODYNE) 100 MG tablet SMARTSI Tablet(s) By Mouth Morning-Night      tretinoin (RETIN-A) 0.1 % cream Apply 1 application  topically every evening.       No current facility-administered medications on file prior to visit.

## 2025-08-05 ENCOUNTER — ON-DEMAND VIRTUAL (OUTPATIENT)
Dept: URGENT CARE | Facility: CLINIC | Age: 30
End: 2025-08-05
Payer: COMMERCIAL

## 2025-08-05 DIAGNOSIS — B37.0 ORAL THRUSH: Primary | ICD-10-CM

## 2025-08-05 PROCEDURE — 98005 SYNCH AUDIO-VIDEO EST LOW 20: CPT | Mod: 95,,, | Performed by: NURSE PRACTITIONER

## 2025-08-05 RX ORDER — NYSTATIN 100000 [USP'U]/ML
4 SUSPENSION ORAL 4 TIMES DAILY
Qty: 224 ML | Refills: 0 | Status: SHIPPED | OUTPATIENT
Start: 2025-08-05 | End: 2025-08-19

## 2025-08-05 NOTE — PROGRESS NOTES
Subjective:      Patient ID: Christopher Webber is a 29 y.o. female.    Vitals:  vitals were not taken for this visit.     Chief Complaint: No chief complaint on file.      Visit Type: TELE AUDIOVISUAL - This visit was conducted virtually based on  subjective information and limited objective exam    Present with the patient at the time of consultation: TELEMED PRESENT WITH PATIENT: None  LOCATION OF PATIENT [place], [place]  Two patient identifiers used to verify patient- saying out date of birth and full name.       Past Medical History:   Diagnosis Date    Anxiety state     Depression, major, recurrent, mild 2020    History of fetal demise, not currently pregnant 2024    History of Narcolepsy     10 years ago    Preeclampsia 2019     Past Surgical History:   Procedure Laterality Date     SECTION      TONSILLECTOMY       Review of patient's allergies indicates:   Allergen Reactions    Asa [aspirin] Other (See Comments)     IBS      Medications Ordered Prior to Encounter[1]  Family History   Problem Relation Name Age of Onset    Hypertension Father      Breast cancer Neg Hx      Cancer Neg Hx      Colon cancer Neg Hx      Diabetes Neg Hx      Eclampsia Neg Hx      Miscarriages / Stillbirths Neg Hx       labor Neg Hx      Ovarian cancer Neg Hx      Stroke Neg Hx         Medications Ordered                [id number] - [place], [place] -  [address] AT [facility]    [address], [facility] [address]    Telephone: [phone]   Fax: [phone]   Hours: Not open 24 hours                         E-Prescribed (1 of 1)              nystatin (MYCOSTATIN) 100,000 unit/mL suspension    Sig: Take 4 mLs (400,000 Units total) by mouth 4 (four) times daily. Swish and spit for 14 days       Start: 25     Quantity: 224 mL Refills: 0                           Ohs Peq Odvv Intake    2025  3:48 PM CDT - Filed by Patient   What is your current physical  address in the event of a medical emergency? [address] [facility] [address]   Are you able to take your vital signs? No   Please attach any relevant images or files    Ohs Peq Odvv Preferred Language          30 yo female, 10 weeks pregnant, complains of white patches to oral mucosa and tongue x 3-4 days. + pain when eating. Denies vaping and use of inhalers.       ROS     Objective:   The physical exam was conducted virtually.    AAO x 3 ; no acute distress noted; appearance normal; mood and behavior normal; thought process normal  Head- normocephalic  Nose- appears normal, no discharge or erythema  Eyes- pupils appear normal in size, no drainage, no erythema  Ears- normal appearing; no discharge, no erythema  Mouth- white patches to tongue   Oropharynx- no erythema, lesions  Lungs- breathing at a normal rate, no acute distress noted  Heart- no reports of tachycardia, palpitations, chest pain  Abdomen- non distended, non tender reported by patient  Skin- warm and dry, no erythema or edema noted by patient or visualized        Assessment:     1. Oral thrush        Plan:     Exchanged toothbrush for new one   Complete nystatin as prescribed       Thank you for choosing [facility]!    Our goal in the [facility] is to always provide outstanding medical care. You may receive a survey by mail or e-mail in the next week regarding your experience today. We would greatly appreciate you completing and returning the survey. Your feedback provides us with a way to recognize our staff who provide very good care, and it helps us learn how to improve when your experience was below our aspiration of excellence.         We appreciate you trusting us with your medical care. We hope you feel better soon. We will be happy to take care of you for all of your future medical needs.    You must understand that you've received an Urgent Care treatment only and that you may be released before  all your medical problems are known or treated. You, the patient, will arrange for follow up care as instructed.    Follow up with your PCP or specialty clinic as directed in the next 1-2 weeks if not improved or as needed.  You can call [phone] to schedule an appointment with the appropriate provider.    If your condition worsens we recommend that you receive another evaluation in person, with your primary care provider, urgent care or at the emergency room immediately or contact your primary medical clinics after hours call service to discuss your concerns.         Oral thrush  -     nystatin (MYCOSTATIN) 100,000 unit/mL suspension; Take 4 mLs (400,000 Units total) by mouth 4 (four) times daily. Swish and spit for 14 days  Dispense: 224 mL; Refill: 0                        [1]   Current Outpatient Medications on File Prior to Visit   Medication Sig Dispense Refill    ALPRAZolam (XANAX) 0.25 MG tablet Take 0.25 mg by mouth daily as needed.      cetirizine (ZYRTEC) 10 MG tablet Take 1 tablet by mouth every morning.      ketoconazole (NIZORAL) 2 % cream Apply topically once daily. for 10 days 30 g 0    labetaloL (NORMODYNE) 100 MG tablet SMARTSI Tablet(s) By Mouth Morning-Night      tretinoin (RETIN-A) 0.1 % cream Apply 1 application  topically every evening.       No current facility-administered medications on file prior to visit.